# Patient Record
Sex: MALE | Race: WHITE | HISPANIC OR LATINO | Employment: FULL TIME | URBAN - METROPOLITAN AREA
[De-identification: names, ages, dates, MRNs, and addresses within clinical notes are randomized per-mention and may not be internally consistent; named-entity substitution may affect disease eponyms.]

---

## 2020-04-15 ENCOUNTER — TELEMEDICINE (OUTPATIENT)
Dept: FAMILY MEDICINE CLINIC | Facility: CLINIC | Age: 65
End: 2020-04-15
Payer: COMMERCIAL

## 2020-04-15 DIAGNOSIS — I10 ESSENTIAL HYPERTENSION: ICD-10-CM

## 2020-04-15 DIAGNOSIS — M79.605 PAIN IN POSTERIOR LEFT LOWER EXTREMITY: Primary | ICD-10-CM

## 2020-04-15 PROCEDURE — 99213 OFFICE O/P EST LOW 20 MIN: CPT | Performed by: FAMILY MEDICINE

## 2020-04-15 RX ORDER — IBUPROFEN 400 MG/1
400 TABLET ORAL EVERY 6 HOURS PRN
Qty: 120 TABLET | Refills: 0 | Status: SHIPPED | OUTPATIENT
Start: 2020-04-15 | End: 2021-06-09

## 2020-04-29 ENCOUNTER — TELEMEDICINE (OUTPATIENT)
Dept: FAMILY MEDICINE CLINIC | Facility: CLINIC | Age: 65
End: 2020-04-29
Payer: COMMERCIAL

## 2020-04-29 DIAGNOSIS — I10 ESSENTIAL HYPERTENSION: Primary | ICD-10-CM

## 2020-04-29 DIAGNOSIS — M79.605 PAIN IN POSTERIOR LEFT LOWER EXTREMITY: ICD-10-CM

## 2020-04-29 PROCEDURE — 99214 OFFICE O/P EST MOD 30 MIN: CPT | Performed by: FAMILY MEDICINE

## 2020-04-29 RX ORDER — LOSARTAN POTASSIUM 100 MG/1
100 TABLET ORAL DAILY
COMMUNITY
End: 2020-04-29 | Stop reason: SDUPTHER

## 2020-04-29 RX ORDER — CHOLECALCIFEROL TAB 125 MCG (5000 UNIT) 125 MCG (5000 UT)
2000 TAB DAILY
Qty: 36 TABLET | Refills: 0 | Status: SHIPPED | OUTPATIENT
Start: 2020-04-29 | End: 2020-05-11

## 2020-04-29 RX ORDER — CHOLECALCIFEROL TAB 125 MCG (5000 UNIT) 125 MCG (5000 UT)
1 TAB DAILY
COMMUNITY
Start: 2020-01-24 | End: 2020-04-29 | Stop reason: SDUPTHER

## 2020-04-29 RX ORDER — LOSARTAN POTASSIUM 100 MG/1
100 TABLET ORAL DAILY
Qty: 90 TABLET | Refills: 0 | Status: SHIPPED | OUTPATIENT
Start: 2020-04-29 | End: 2021-06-09 | Stop reason: SDUPTHER

## 2020-05-11 RX ORDER — CHOLECALCIFEROL (VITAMIN D3) 125 MCG
TABLET ORAL
Qty: 36 TABLET | Refills: 0 | Status: SHIPPED | OUTPATIENT
Start: 2020-05-11 | End: 2020-06-02 | Stop reason: SDUPTHER

## 2020-05-11 RX ORDER — MELATONIN
1000 2 TIMES DAILY
Qty: 180 TABLET | Refills: 0 | Status: SHIPPED | OUTPATIENT
Start: 2020-05-11 | End: 2021-06-09 | Stop reason: SDUPTHER

## 2020-05-26 ENCOUNTER — HOSPITAL ENCOUNTER (OUTPATIENT)
Dept: ULTRASOUND IMAGING | Facility: HOSPITAL | Age: 65
Discharge: HOME | End: 2020-05-26
Attending: FAMILY MEDICINE
Payer: COMMERCIAL

## 2020-05-26 DIAGNOSIS — M79.605 PAIN IN POSTERIOR LEFT LOWER EXTREMITY: ICD-10-CM

## 2020-05-26 PROCEDURE — 93970 EXTREMITY STUDY: CPT

## 2020-05-27 PROCEDURE — 93970 EXTREMITY STUDY: CPT | Performed by: SURGERY

## 2020-06-02 ENCOUNTER — OFFICE VISIT (OUTPATIENT)
Dept: FAMILY MEDICINE CLINIC | Facility: CLINIC | Age: 65
End: 2020-06-02
Payer: COMMERCIAL

## 2020-06-02 VITALS
OXYGEN SATURATION: 97 % | HEIGHT: 69 IN | WEIGHT: 199 LBS | TEMPERATURE: 98.1 F | SYSTOLIC BLOOD PRESSURE: 130 MMHG | HEART RATE: 73 BPM | DIASTOLIC BLOOD PRESSURE: 88 MMHG | RESPIRATION RATE: 16 BRPM | BODY MASS INDEX: 29.47 KG/M2

## 2020-06-02 DIAGNOSIS — J31.0 CHRONIC RHINITIS: ICD-10-CM

## 2020-06-02 DIAGNOSIS — I10 ESSENTIAL HYPERTENSION: Primary | ICD-10-CM

## 2020-06-02 DIAGNOSIS — M79.605 PAIN IN POSTERIOR LEFT LOWER EXTREMITY: ICD-10-CM

## 2020-06-02 PROCEDURE — 99214 OFFICE O/P EST MOD 30 MIN: CPT | Performed by: FAMILY MEDICINE

## 2020-06-02 PROCEDURE — 3075F SYST BP GE 130 - 139MM HG: CPT | Performed by: FAMILY MEDICINE

## 2020-06-02 PROCEDURE — 3008F BODY MASS INDEX DOCD: CPT | Performed by: FAMILY MEDICINE

## 2020-06-02 PROCEDURE — 1036F TOBACCO NON-USER: CPT | Performed by: FAMILY MEDICINE

## 2020-06-02 PROCEDURE — 3079F DIAST BP 80-89 MM HG: CPT | Performed by: FAMILY MEDICINE

## 2020-06-02 RX ORDER — MONTELUKAST SODIUM 10 MG/1
10 TABLET ORAL
Qty: 30 TABLET | Refills: 2 | Status: SHIPPED | OUTPATIENT
Start: 2020-06-02 | End: 2022-01-25 | Stop reason: SDUPTHER

## 2021-06-09 ENCOUNTER — OFFICE VISIT (OUTPATIENT)
Dept: FAMILY MEDICINE CLINIC | Facility: CLINIC | Age: 66
End: 2021-06-09
Payer: COMMERCIAL

## 2021-06-09 VITALS
SYSTOLIC BLOOD PRESSURE: 130 MMHG | DIASTOLIC BLOOD PRESSURE: 88 MMHG | BODY MASS INDEX: 29.33 KG/M2 | HEART RATE: 73 BPM | OXYGEN SATURATION: 97 % | HEIGHT: 69 IN | TEMPERATURE: 97 F | WEIGHT: 198 LBS

## 2021-06-09 DIAGNOSIS — R53.83 FATIGUE, UNSPECIFIED TYPE: ICD-10-CM

## 2021-06-09 DIAGNOSIS — Z00.00 ANNUAL PHYSICAL EXAM: ICD-10-CM

## 2021-06-09 DIAGNOSIS — I10 ESSENTIAL HYPERTENSION: ICD-10-CM

## 2021-06-09 DIAGNOSIS — R06.00 EXERTIONAL DYSPNEA: ICD-10-CM

## 2021-06-09 DIAGNOSIS — M79.605 PAIN IN POSTERIOR LEFT LOWER EXTREMITY: ICD-10-CM

## 2021-06-09 DIAGNOSIS — G89.29 CHRONIC PAIN OF RIGHT KNEE: ICD-10-CM

## 2021-06-09 DIAGNOSIS — R10.33 PERIUMBILICAL ABDOMINAL PAIN: ICD-10-CM

## 2021-06-09 DIAGNOSIS — M25.561 CHRONIC PAIN OF RIGHT KNEE: ICD-10-CM

## 2021-06-09 DIAGNOSIS — Z00.00 ROUTINE MEDICAL EXAM: Primary | ICD-10-CM

## 2021-06-09 PROCEDURE — 1036F TOBACCO NON-USER: CPT | Performed by: FAMILY MEDICINE

## 2021-06-09 PROCEDURE — 3008F BODY MASS INDEX DOCD: CPT | Performed by: FAMILY MEDICINE

## 2021-06-09 PROCEDURE — 3079F DIAST BP 80-89 MM HG: CPT | Performed by: FAMILY MEDICINE

## 2021-06-09 PROCEDURE — 3075F SYST BP GE 130 - 139MM HG: CPT | Performed by: FAMILY MEDICINE

## 2021-06-09 PROCEDURE — 3288F FALL RISK ASSESSMENT DOCD: CPT | Performed by: FAMILY MEDICINE

## 2021-06-09 PROCEDURE — 99397 PER PM REEVAL EST PAT 65+ YR: CPT | Performed by: FAMILY MEDICINE

## 2021-06-09 PROCEDURE — 3725F SCREEN DEPRESSION PERFORMED: CPT | Performed by: FAMILY MEDICINE

## 2021-06-09 PROCEDURE — 1101F PT FALLS ASSESS-DOCD LE1/YR: CPT | Performed by: FAMILY MEDICINE

## 2021-06-09 RX ORDER — LOSARTAN POTASSIUM 100 MG/1
100 TABLET ORAL DAILY
Qty: 90 TABLET | Refills: 0 | Status: SHIPPED | OUTPATIENT
Start: 2021-06-09 | End: 2021-09-07

## 2021-06-09 RX ORDER — MELATONIN
1000 DAILY
Qty: 90 TABLET | Refills: 0 | Status: SHIPPED | OUTPATIENT
Start: 2021-06-09 | End: 2021-09-07

## 2021-06-10 NOTE — PROGRESS NOTES
ADULT ANNUAL PHYSICAL  [address] [facility] CARE [place]    NAME: Dawit Segura  AGE: 73 y o  SEX: male  : 1955     DATE: 2021     Assessment and Plan:bertram  Medical  Exam -  Discussed  Diet  Exercise   Life  Style  Modifications  His  bp  Is  Stable     htn -  Keep  The  bp  Log   Will  F/u  With  Labs     Rt knee pain  -  Discussed  Precautions  Normal  Exam  Will  F/u  With  X ray  Tylenol prn     Exertional  Dyspnea  At  Times  -  No  Cp  Or  Sob   Will  Get  A  Cardiac  Evaluation     Abdominal pain  Paraumbilical  At  Times  No pain  Currently  Normal  Exam  Today   No  Other  Gi  Symptoms  Will   F/u  With  US      Problem List Items Addressed This Visit        Cardiovascular and Mediastinum    Essential hypertension    Relevant Medications    losartan (COZAAR) 100 MG tablet    Other Relevant Orders    CBC and differential    Comprehensive metabolic panel    Lipid panel       Other    Pain in posterior left lower extremity    Relevant Medications    cholecalciferol (VITAMIN D3) 1,000 units tablet    Routine medical exam - Primary    Chronic pain of right knee    Relevant Orders    XR knee 3 vw right non injury      Other Visit Diagnoses     Fatigue, unspecified type        Relevant Orders    TSH, 3rd generation    Periumbilical abdominal pain        Relevant Orders    US abdomen complete    Lipase    Amylase    Exertional dyspnea        Relevant Orders    Ambulatory referral to Cardiology          Immunizations and preventive care screenings were discussed with patient today  Appropriate education was printed on patient's after visit summary  Counseling:  · Dental Health: discussed importance of regular tooth brushing, flossing, and dental visits  No follow-ups on file  Chief Complaint:     Chief Complaint   Patient presents with    Follow-up     Stomach bump when he tenses       Knee Pain     5/10      History of Present Illness:     Adult Annual Physical   Patient here for a comprehensive physical exam  The patient reports problems - as  listed  Diet and Physical Activity  · Diet/Nutrition: well balanced diet  · Exercise: walking  Depression Screening  PHQ-9 Depression Screening    PHQ-9:   Frequency of the following problems over the past two weeks:      Little interest or pleasure in doing things: 2 - more than half the days  Feeling down, depressed, or hopeless: 0 - not at all  PHQ-2 Score: 2       General Health  · Sleep: sleeps well  · Hearing: normal - bilateral   · Vision: no vision problems  · Dental: regular dental visits   Health  · Symptoms include: none     Review of Systems:     Review of Systems   Constitutional: Negative for fatigue and fever  HENT: Negative for congestion, sinus pressure and sore throat  Eyes: Negative for photophobia, pain, discharge, redness and itching  Respiratory: Negative for shortness of breath and wheezing  Cardiovascular: Negative for chest pain, palpitations and leg swelling  H/o  Exertional  Dyspnea   , htn   Gastrointestinal: Negative for abdominal pain, constipation, diarrhea, nausea and vomiting  H/o  Abdominal pain   Endocrine: Negative for cold intolerance, heat intolerance, polydipsia and polyphagia  Genitourinary: Negative for dysuria, hematuria and urgency  Musculoskeletal: Positive for arthralgias  Negative for back pain, gait problem and joint swelling  Neurological: Negative for dizziness and headaches  Psychiatric/Behavioral: Negative for self-injury and sleep disturbance  The patient is not nervous/anxious         Past Medical History:     Past Medical History:   Diagnosis Date    Hyperlipidemia     Hypertension     Seizure disorder ([address] )     Sleep apnea     Tremors of nervous system     per Brittany Rivera Vitamin D deficiency disease       Past Surgical History:     Past Surgical History:   Procedure Laterality Date    NO PAST SURGERIES Family History:     Family History   Problem Relation Age of Onset    Colon cancer Brother     Stroke Brother     Heart disease Brother         pacemaker in situ      Social History:     E-Cigarette/Vaping    E-Cigarette Use Never User      E-Cigarette/Vaping Substances    Nicotine No     THC No     CBD No     Flavoring No     Other No     Unknown No      Social History     Socioeconomic History    Marital status: Single     Spouse name: None    Number of children: None    Years of education: 15    Highest education level: High school graduate   Occupational History    Occupation:    Social Needs    Financial resource strain: Not hard at all   Miguel-Aurelia insecurity     Worry: Never true     Inability: Never true    Transportation needs     Medical: No     Non-medical: No   Tobacco Use    Smoking status: Never Smoker    Smokeless tobacco: Never Used   Substance and Sexual Activity    Alcohol use: Not Currently     Comment: No use     Drug use: Never     Comment: No use    Sexual activity: None   Lifestyle    Physical activity     Days per week: 5 days     Minutes per session: 60 min    Stress: Not at all   Relationships    Social connections     Talks on phone: Once a week     Gets together: Once a week     Attends Orthodoxy service: Never     Active member of club or organization: No     Attends meetings of clubs or organizations: Never     Relationship status: Living with partner    Intimate partner violence     Fear of current or ex partner: No     Emotionally abused: No     Physically abused: No     Forced sexual activity: No   Other Topics Concern    None   Social History Narrative    · Most recent tobacco use screenin2020      · Were you activated, into active duty, as a member of the [employer] or as a Reservist:   No       · Sexual orientation:   Heterosexual      · Exercise level:   None      · Diet:   Regular      · General stress level:   Low · Caffeine intake:   Heavy    · Guns present in home:   No      · Seat belts used routinely:   Yes      · Smoke alarm in home: Yes      · Advance directive:   No       Current Medications:     Current Outpatient Medications   Medication Sig Dispense Refill    cholecalciferol (VITAMIN D3) 1,000 units tablet Take 1 tablet (1,000 Units total) by mouth daily 90 tablet 0    ibuprofen (MOTRIN) 400 mg tablet Take 1 tablet (400 mg total) by mouth every 6 (six) hours as needed for mild pain 120 tablet 0    losartan (COZAAR) 100 MG tablet Take 1 tablet (100 mg total) by mouth daily 90 tablet 0    montelukast (SINGULAIR) 10 mg tablet Take 1 tablet (10 mg total) by mouth daily at bedtime 30 tablet 2     No current facility-administered medications for this visit  Allergies:     No Known Allergies   Physical Exam:     /88 (BP Location: Left arm, Patient Position: Sitting, Cuff Size: Standard)   Pulse 73   Temp (!) 97 °F (36 1 °C) (Tympanic)   Ht 5' 9" (1 753 m)   Wt 89 8 kg (198 lb)   SpO2 97%   BMI 29 24 kg/m²     Physical Exam  Vitals signs and nursing note reviewed  Constitutional:       General: He is not in acute distress  Appearance: Normal appearance  He is not ill-appearing, toxic-appearing or diaphoretic  HENT:      Head: Normocephalic  Nose: Nose normal    Eyes:      Extraocular Movements: Extraocular movements intact  Conjunctiva/sclera: Conjunctivae normal       Pupils: Pupils are equal, round, and reactive to light  Neck:      Musculoskeletal: Normal range of motion and neck supple  No neck rigidity or muscular tenderness  Vascular: No carotid bruit  Cardiovascular:      Rate and Rhythm: Normal rate and regular rhythm  Pulses: Normal pulses  Heart sounds: Normal heart sounds  No murmur  No gallop  Pulmonary:      Effort: Pulmonary effort is normal       Breath sounds: Normal breath sounds     Abdominal:      General: Bowel sounds are normal  There is no distension  Palpations: There is no mass  Tenderness: There is no abdominal tenderness  There is no guarding or rebound  Musculoskeletal: Normal range of motion  General: No swelling  Right lower leg: No edema  Left lower leg: No edema  Lymphadenopathy:      Cervical: No cervical adenopathy  Neurological:      General: No focal deficit present  Mental Status: He is alert and oriented to person, place, and time  Cranial Nerves: No cranial nerve deficit  Psychiatric:         Mood and Affect: Mood normal          Behavior: Behavior normal          Thought Content:  Thought content normal           Catalina Key MD  [facility]

## 2021-06-10 NOTE — PROGRESS NOTES
BMI Counseling: Body mass index is 29 24 kg/m²  The BMI is above normal  Nutrition recommendations include decreasing portion sizes, encouraging healthy choices of fruits and vegetables, decreasing fast food intake, consuming healthier snacks, limiting drinks that contain sugar, moderation in carbohydrate intake, increasing intake of lean protein, reducing intake of saturated and trans fat and reducing intake of cholesterol  Exercise recommendations include moderate physical activity 150 minutes/week

## 2021-06-10 NOTE — PATIENT INSTRUCTIONS
Wellness Visit for Adults   AMBULATORY CARE:   A wellness visit  is when you see your healthcare provider to get screened for health problems  Your healthcare provider will also give you advice on how to stay healthy  Write down your questions so you remember to ask them  Ask your healthcare provider how often you should have a wellness visit  What happens at a wellness visit:  Your healthcare provider will ask about your health, and your family history of health problems  This includes high blood pressure, heart disease, and cancer  He or she will ask if you have symptoms that concern you, if you smoke, and about your mood  You may also be asked about your intake of medicines, supplements, food, and alcohol  Any of the following may be done:  · Your weight  will be checked  Your height may also be checked so your body mass index (BMI) can be calculated  Your BMI shows if you are at a healthy weight  · Your blood pressure  and heart rate will be checked  Your temperature may also be checked  · Blood and urine tests  may be done  Blood tests may be done to check your cholesterol levels  Abnormal cholesterol levels increase your risk for heart disease and stroke  You may also need a blood or urine test to check for diabetes if you are at increased risk  Urine tests may be done to look for signs of an infection or kidney disease  · A physical exam  includes checking your heartbeat and lungs with a stethoscope  Your healthcare provider may also check your skin to look for sun damage  · Screening tests  may be recommended  A screening test is done to check for diseases that may not cause symptoms  The screening tests you may need depend on your age, gender, family history, and lifestyle habits  For example, colorectal screening may be recommended if you are 48years old or older  Screening tests you need if you are a woman:   · A Pap smear  is used to screen for cervical cancer   Pap smears are usually done every 3 to 5 years depending on your age  You may need them more often if you have had abnormal Pap smear test results in the past  Ask your healthcare provider how often you should have a Pap smear  · A mammogram  is an x-ray of your breasts to screen for breast cancer  Experts recommend mammograms every 2 years starting at age 48 years  You may need a mammogram at age 52 years or younger if you have an increased risk for breast cancer  Talk to your healthcare provider about when you should start having mammograms and how often you need them  Vaccines you may need:   · Get an influenza vaccine  every year  The influenza vaccine protects you from the flu  Several types of viruses cause the flu  The viruses change over time, so new vaccines are made each year  · Get a tetanus-diphtheria (Td) booster vaccine  every 10 years  This vaccine protects you against tetanus and diphtheria  Tetanus is a severe infection that may cause painful muscle spasms and lockjaw  Diphtheria is a severe bacterial infection that causes a thick covering in the back of your mouth and throat  · Get a human papillomavirus (HPV) vaccine  if you are female and aged 23 to 32 or male 23 to 24 and never received it  This vaccine protects you from HPV infection  HPV is the most common infection spread by sexual contact  HPV may also cause vaginal, penile, and anal cancers  · Get a pneumococcal vaccine  if you are aged 72 years or older  The pneumococcal vaccine is an injection given to protect you from pneumococcal disease  Pneumococcal disease is an infection caused by pneumococcal bacteria  The infection may cause pneumonia, meningitis, or an ear infection  · Get a shingles vaccine  if you are 60 or older, even if you have had shingles before  The shingles vaccine is an injection to protect you from the varicella-zoster virus  This is the same virus that causes chickenpox   Shingles is a painful rash that develops in people who had chickenpox or have been exposed to the virus  How to eat healthy:  My Plate is a model for planning healthy meals  It shows the types and amounts of foods that should go on your plate  Fruits and vegetables make up about half of your plate, and grains and protein make up the other half  A serving of dairy is included on the side of your plate  The amount of calories and serving sizes you need depends on your age, gender, weight, and height  Examples of healthy foods are listed below:  · Eat a variety of vegetables  such as dark green, red, and orange vegetables  You can also include canned vegetables low in sodium (salt) and frozen vegetables without added butter or sauces  · Eat a variety of fresh fruits , canned fruit in 100% juice, frozen fruit, and dried fruit  · Include whole grains  At least half of the grains you eat should be whole grains  Examples include whole-wheat bread, wheat pasta, brown rice, and whole-grain cereals such as oatmeal     · Eat a variety of protein foods such as seafood (fish and shellfish), lean meat, and poultry without skin (turkey and chicken)  Examples of lean meats include pork leg, shoulder, or tenderloin, and beef round, sirloin, tenderloin, and extra lean ground beef  Other protein foods include eggs and egg substitutes, beans, peas, soy products, nuts, and seeds  · Choose low-fat dairy products such as skim or 1% milk or low-fat yogurt, cheese, and cottage cheese  · Limit unhealthy fats  such as butter, hard margarine, and shortening  Exercise:  Exercise at least 30 minutes per day on most days of the week  Some examples of exercise include walking, biking, dancing, and swimming  You can also fit in more physical activity by taking the stairs instead of the elevator or parking farther away from stores  Include muscle strengthening activities 2 days each week  Regular exercise provides many health benefits   It helps you manage your weight, and decreases your risk for type 2 diabetes, heart disease, stroke, and high blood pressure  Exercise can also help improve your mood  Ask your healthcare provider about the best exercise plan for you  General health and safety guidelines:   · Do not smoke  Nicotine and other chemicals in cigarettes and cigars can cause lung damage  Ask your healthcare provider for information if you currently smoke and need help to quit  E-cigarettes or smokeless tobacco still contain nicotine  Talk to your healthcare provider before you use these products  · Limit alcohol  A drink of alcohol is 12 ounces of beer, 5 ounces of wine, or 1½ ounces of liquor  · Lose weight, if needed  Being overweight increases your risk of certain health conditions  These include heart disease, high blood pressure, type 2 diabetes, and certain types of cancer  · Protect your skin  Do not sunbathe or use tanning beds  Use sunscreen with a SPF 15 or higher  Apply sunscreen at least 15 minutes before you go outside  Reapply sunscreen every 2 hours  Wear protective clothing, hats, and sunglasses when you are outside  · Drive safely  Always wear your seatbelt  Make sure everyone in your car wears a seatbelt  A seatbelt can save your life if you are in an accident  Do not use your cell phone when you are driving  This could distract you and cause an accident  Pull over if you need to make a call or send a text message  · Practice safe sex  Use latex condoms if are sexually active and have more than one partner  Your healthcare provider may recommend screening tests for sexually transmitted infections (STIs)  · Wear helmets, lifejackets, and protective gear  Always wear a helmet when you ride a bike or motorcycle, go skiing, or play sports that could cause a head injury  Wear protective equipment when you play sports  Wear a lifejacket when you are on a boat or doing water sports      © Copyright [employer] 2020 Information is for End User's use only and may not be sold, redistributed or otherwise used for commercial purposes  All illustrations and images included in CareNotes® are the copyrighted property of A D A M , Inc  or Monico Patel  The above information is an  only  It is not intended as medical advice for individual conditions or treatments  Talk to your doctor, nurse or pharmacist before following any medical regimen to see if it is safe and effective for you  Obesity   AMBULATORY CARE:   Obesity  is when your body mass index (BMI) is greater than 30  Your healthcare provider will use your height and weight to measure your BMI  The risks of obesity include  many health problems, such as injuries or physical disability  You may need tests to check for the following:  · Diabetes    · High blood pressure or high cholesterol    · Heart disease    · Gallbladder or liver disease    · Cancer of the colon, breast, prostate, liver, or kidney    · Sleep apnea    · Arthritis or gout    Seek care immediately if:   · You have a severe headache, confusion, or difficulty speaking  · You have weakness on one side of your body  · You have chest pain, sweating, or shortness of breath  Contact your healthcare provider if:   · You have symptoms of gallbladder or liver disease, such as pain in your upper abdomen  · You have knee or hip pain and discomfort while walking  · You have symptoms of diabetes, such as intense hunger and thirst, and frequent urination  · You have symptoms of sleep apnea, such as snoring or daytime sleepiness  · You have questions or concerns about your condition or care  Treatment for obesity  focuses on helping you lose weight to improve your health  Even a small decrease in BMI can reduce the risk for many health problems  Your healthcare provider will help you set a weight-loss goal   · Lifestyle changes  are the first step in treating obesity   These include making healthy food choices and getting regular physical activity  Your healthcare provider may suggest a weight-loss program that involves coaching, education, and therapy  · Medicine  may help you lose weight when it is used with a healthy diet and physical activity  · Surgery  can help you lose weight if you are very obese and have other health problems  There are several types of weight-loss surgery  Ask your healthcare provider for more information  Be successful losing weight:   · Set small, realistic goals  An example of a small goal is to walk for 20 minutes 5 days a week  Anther goal is to lose 5% of your body weight  · Tell friends, family members, and coworkers about your goals  and ask for their support  Ask a friend to lose weight with you, or join a weight-loss support group  · Identify foods or triggers that may cause you to overeat , and find ways to avoid them  Remove tempting high-calorie foods from your home and workplace  Place a bowl of fresh fruit on your kitchen counter  If stress causes you to eat, then find other ways to cope with stress  · Keep a diary to track what you eat and drink  Also write down how many minutes of physical activity you do each day  Weigh yourself once a week and record it in your diary  Eating changes: You will need to eat 500 to 1,000 fewer calories each day than you currently eat to lose 1 to 2 pounds a week  The following changes will help you cut calories:  · Eat smaller portions  Use small plates, no larger than 9 inches in diameter  Fill your plate half full of fruits and vegetables  Measure your food using measuring cups until you know what a serving size looks like  · Eat 3 meals and 1 or 2 snacks each day  Plan your meals in advance  [place] and eat at home most of the time  Eat slowly  Do not skip meals  Skipping meals can lead to overeating later in the day  This can make it harder for you to lose weight   Talk with a dietitian to help you make a meal plan and schedule that is right for you  · Eat fruits and vegetables at every meal   They are low in calories and high in fiber, which makes you feel full  Do not add butter, margarine, or cream sauce to vegetables  Use herbs to season steamed vegetables  · Eat less fat and fewer fried foods  Eat more baked or grilled chicken and fish  These protein sources are lower in calories and fat than red meat  Limit fast food  Dress your salads with olive oil and vinegar instead of bottled dressing  · Limit the amount of sugar you eat  Do not drink sugary beverages  Limit alcohol  Activity changes:  Physical activity is good for your body in many ways  It helps you burn calories and build strong muscles  It decreases stress and depression, and improves your mood  It can also help you sleep better  Talk to your healthcare provider before you begin an exercise program   · Exercise for at least 30 minutes 5 days a week  Start slowly  Set aside time each day for physical activity that you enjoy and that is convenient for you  It is best to do both weight training and an activity that increases your heart rate, such as walking, bicycling, or swimming  · Find ways to be more active  Do yard work and housecleaning  Walk up the stairs instead of using elevators  Spend your leisure time going to events that require walking, such as outdoor festivals or fairs  This extra physical activity can help you lose weight and keep it off  Follow up with your healthcare provider as directed: You may need to meet with a dietitian  Write down your questions so you remember to ask them during your visits  © Copyright [address] Information is for End User's use only and may not be sold, redistributed or otherwise used for commercial purposes   All illustrations and images included in CareNotes® are the copyrighted property of A D A M , Inc  or [facility] Dilshad Fortune   The above information is an  only  It is not intended as medical advice for individual conditions or treatments  Talk to your doctor, nurse or pharmacist before following any medical regimen to see if it is safe and effective for you  Cholesterol and Your Health   AMBULATORY CARE:   Cholesterol  is a waxy, fat-like substance  Your body uses cholesterol to make hormones and new cells, and to protect nerves  Cholesterol is made by your body  It also comes from certain foods you eat, such as meat and dairy products  Your healthcare provider can help you set goals for your cholesterol levels  He or she can help you create a plan to meet your goals  Cholesterol level goals: Your cholesterol level goals depend on your risk for heart disease, your age, and your other health conditions  The following are general guidelines:  · Total cholesterol  includes low-density lipoprotein (LDL), high-density lipoprotein (HDL), and triglyceride levels  The total cholesterol level should be lower than 200 mg/dL and is best at about 150 mg/dL  · LDL cholesterol  is called bad cholesterol  because it forms plaque in your arteries  As plaque builds up, your arteries become narrow, and less blood flows through  When plaque decreases blood flow to your heart, you may have chest pain  If plaque completely blocks an artery that brings blood to your heart, you may have a heart attack  Plaque can break off and form blood clots  Blood clots may block arteries in your brain and cause a stroke  The level should be less than 130 mg/dL and is best at about 100 mg/dL  · HDL cholesterol  is called good cholesterol  because it helps remove LDL cholesterol from your arteries  It does this by attaching to LDL cholesterol and carrying it to your liver  Your liver breaks down LDL cholesterol so your body can get rid of it  High levels of HDL cholesterol can help prevent a heart attack and stroke   Low levels of HDL cholesterol can increase your risk for heart disease, heart attack, and stroke  The level should be 60 mg/dL or higher  · Triglycerides  are a type of fat that store energy from foods you eat  High levels of triglycerides also cause plaque buildup  This can increase your risk for a heart attack or stroke  If your triglyceride level is high, your LDL cholesterol level may also be high  The level should be less than 150 mg/dL  What increases your risk for high cholesterol:   · Smoking cigarettes    · Being overweight or obese, or not getting enough exercise    · Drinking large amounts of alcohol    · A medical condition such as hypertension (high blood pressure) or diabetes    · Certain genes passed from your parents to you    · Age older than 65 years    What you need to know about having your cholesterol levels checked: Adults 21to 39years of age should have their cholesterol levels checked every 4 to 6 years  Adults 45 years or older should have their cholesterol checked every 1 to 2 years  You may need your cholesterol checked more often, or at a younger age, if you have risk factors for heart disease  You may also need to have your cholesterol checked more often if you have other health conditions, such as diabetes  Blood tests are used to check cholesterol levels  Blood tests measure your levels of triglycerides, LDL cholesterol, and HDL cholesterol  How healthy fats affect your cholesterol levels:  Healthy fats, also called unsaturated fats, help lower LDL cholesterol and triglyceride levels  Healthy fats include the following:  · Monounsaturated fats  are found in foods such as olive oil, canola oil, avocado, nuts, and olives  · Polyunsaturated fats,  such as omega 3 fats, are found in fish, such as salmon, trout, and tuna  They can also be found in plant foods such as flaxseed, walnuts, and soybeans  How unhealthy fats affect your cholesterol levels:  Unhealthy fats increase LDL cholesterol and triglyceride levels   They are found in foods high in cholesterol, saturated fat, and trans fat:  · Cholesterol  is found in eggs, dairy, and meat  · Saturated fat  is found in butter, cheese, ice cream, whole milk, and coconut oil  Saturated fat is also found in meat, such as sausage, hot dogs, and bologna  · Trans fat  is found in liquid oils and is used in fried and baked foods  Foods that contain trans fats include chips, crackers, muffins, sweet rolls, microwave popcorn, and cookies  Treatment  for high cholesterol will also decrease your risk of heart disease, heart attack, and stroke  Treatment may include any of the following:  · Lifestyle changes  may include food, exercise, weight loss, and quitting smoking  You may also need to decrease the amount of alcohol you drink  Your healthcare provider will want you to start with lifestyle changes  Other treatment may be added if lifestyle changes are not enough  · Medicines  may be given to lower your LDL cholesterol, triglyceride levels, or total cholesterol level  You may need medicines to lower your cholesterol if any of the following is true:     ? You have a history of stroke, TIA, unstable angina, or a heart attack  ? Your LDL cholesterol level is 190 mg/dL or higher  ? You are age 36 to 76 years, have diabetes or heart disease risk factors, and your LDL cholesterol is 70 mg/dL or higher  · Supplements  include fish oil, red yeast rice, and garlic  Fish oil may help lower your triglyceride and LDL cholesterol levels  It may also increase your HDL cholesterol level  Red yeast rice may help decrease your total cholesterol level and LDL cholesterol level  Garlic may help lower your total cholesterol level  Do not take these supplements without talking to your healthcare provider  Food changes you can make to lower your cholesterol levels:  A dietitian can help you create a healthy eating plan   He or she can show you how to read food labels and choose foods low in saturated fat, trans fats, and cholesterol  · Decrease the total amount of fat you eat  Choose lean meats, fat-free or 1% fat milk, and low-fat dairy products, such as yogurt and cheese  Try to limit or avoid red meats  Limit or do not eat fried foods or baked goods, such as cookies  · Replace unhealthy fats with healthy fats  Cook foods in olive oil or canola oil  Choose soft margarines that are low in saturated fat and trans fat  Seeds, nuts, and avocados are other examples of healthy fats  · Eat foods with omega-3 fats  Examples include salmon, tuna, mackerel, walnuts, and flaxseed  Eat fish 2 times per week  Pregnant women should not eat fish that have high levels of mercury, such as shark, swordfish, and maryse mackerel  · Increase the amount of high-fiber foods you eat  High-fiber foods can help lower your LDL cholesterol  Aim to get between 20 and 30 grams of fiber each day  Fruits and vegetables are high in fiber  Eat at least 5 servings each day  Other high-fiber foods are whole-grain or whole-wheat breads, pastas, or cereals, and brown rice  Eat 3 ounces of whole-grain foods each day  Increase fiber slowly  You may have abdominal discomfort, bloating, and gas if you add fiber to your diet too quickly  · Eat healthy protein foods  Examples include low-fat dairy products, skinless chicken and turkey, fish, and nuts  · Limit foods and drinks that are high in sugar  Your dietitian or healthcare provider can help you create daily limits for high-sugar foods and drinks  The limit may be lower if you have diabetes or another health condition  Limits can also help you lose weight if needed  Lifestyle changes you can make to lower your cholesterol levels:   · Maintain a healthy weight  Ask your healthcare provider what a healthy weight is for you  Ask him or her to help you create a weight loss plan if needed  Weight loss can decrease your total cholesterol and triglyceride levels   Weight loss may also help keep your blood pressure at a healthy level  · Exercise regularly  Exercise can help lower your total cholesterol level and maintain a healthy weight  Exercise can also help increase your HDL cholesterol level  Work with your healthcare provider to create an exercise program that is right for you  Get at least 30 to 40 minutes of moderate exercise most days of the week  Examples of exercise include brisk walking, swimming, or biking  · Do not smoke  Nicotine and other chemicals in cigarettes and cigars can raise your cholesterol levels  Ask your healthcare provider for information if you currently smoke and need help to quit  E-cigarettes or smokeless tobacco still contain nicotine  Talk to your healthcare provider before you use these products  · Limit or do not drink alcohol  Alcohol can increase your triglyceride levels  Ask your healthcare provider before you drink alcohol  Ask how much is okay for you to drink in 1 day or 1 week  © Copyright [facility] [address] Information is for End User's use only and may not be sold, redistributed or otherwise used for commercial purposes  All illustrations and images included in CareNotes® are the copyrighted property of [employer]  or [address] [place], [place] [address]tobin Fortune   The above information is an  only  It is not intended as medical advice for individual conditions or treatments  Talk to your doctor, nurse or pharmacist before following any medical regimen to see if it is safe and effective for you

## 2021-06-22 ENCOUNTER — HOSPITAL ENCOUNTER (OUTPATIENT)
Dept: ULTRASOUND IMAGING | Facility: HOSPITAL | Age: 66
Discharge: HOME | End: 2021-06-22
Payer: COMMERCIAL

## 2021-06-22 ENCOUNTER — HOSPITAL ENCOUNTER (OUTPATIENT)
Dept: RADIOLOGY | Facility: HOSPITAL | Age: 66
Discharge: HOME | End: 2021-06-22
Payer: COMMERCIAL

## 2021-06-22 DIAGNOSIS — G89.29 CHRONIC PAIN OF RIGHT KNEE: ICD-10-CM

## 2021-06-22 DIAGNOSIS — M25.561 CHRONIC PAIN OF RIGHT KNEE: ICD-10-CM

## 2021-06-22 DIAGNOSIS — R10.33 PERIUMBILICAL ABDOMINAL PAIN: ICD-10-CM

## 2021-06-22 PROCEDURE — 73562 X-RAY EXAM OF KNEE 3: CPT

## 2021-06-24 ENCOUNTER — HOSPITAL ENCOUNTER (OUTPATIENT)
Dept: ULTRASOUND IMAGING | Facility: HOSPITAL | Age: 66
Discharge: HOME | End: 2021-06-24
Payer: COMMERCIAL

## 2021-06-24 PROCEDURE — 76700 US EXAM ABDOM COMPLETE: CPT

## 2021-06-28 ENCOUNTER — TELEPHONE (OUTPATIENT)
Dept: FAMILY MEDICINE CLINIC | Facility: CLINIC | Age: 66
End: 2021-06-28

## 2021-06-28 NOTE — TELEPHONE ENCOUNTER
Left message on voicemail for patient to keep [phone] appointment to get results     Jaylan Shane

## 2021-06-28 NOTE — TELEPHONE ENCOUNTER
----- Message from Wojciech Greenfield, [address] sent at 6/25/2021  5:56 PM EDT -----  Maria Ines Dunlap discuss in greater detail with Dr WEATHERS in [place] make sure she keeps appt      IMPRESSION:     Borderline hepatomegaly     Hepatic mild steatosis with focal fatty sparing                 Workstation performed: [id number]

## 2021-06-30 ENCOUNTER — TELEPHONE (OUTPATIENT)
Dept: FAMILY MEDICINE CLINIC | Facility: CLINIC | Age: 66
End: 2021-06-30

## 2021-06-30 NOTE — TELEPHONE ENCOUNTER
Left a message for the patient to call the office back in regards to his recent imaging results  Knee XR showed arthritis  Dr Oma Tanner will discuss the report in detail at his visit on 7/7

## 2021-07-04 LAB
ALBUMIN SERPL-MCNC: 4.2 G/DL (ref 3.6–5.1)
ALBUMIN/GLOB SERPL: 1.7 (CALC) (ref 1–2.5)
ALP SERPL-CCNC: 93 U/L (ref 35–144)
ALT SERPL-CCNC: 18 U/L (ref 9–46)
AMYLASE SERPL-CCNC: 37 U/L (ref 21–101)
AST SERPL-CCNC: 18 U/L (ref 10–35)
BASOPHILS # BLD AUTO: 28 CELLS/UL (ref 0–200)
BASOPHILS NFR BLD AUTO: 0.4 %
BILIRUB SERPL-MCNC: 0.9 MG/DL (ref 0.2–1.2)
BUN SERPL-MCNC: 15 MG/DL (ref 7–25)
BUN/CREAT SERPL: NORMAL (CALC) (ref 6–22)
CALCIUM SERPL-MCNC: 8.9 MG/DL (ref 8.6–10.3)
CHLORIDE SERPL-SCNC: 103 MMOL/L (ref 98–110)
CHOLEST SERPL-MCNC: 147 MG/DL
CHOLEST/HDLC SERPL: 4.1 (CALC)
CO2 SERPL-SCNC: 29 MMOL/L (ref 20–32)
CREAT SERPL-MCNC: 0.76 MG/DL (ref 0.7–1.25)
EOSINOPHIL # BLD AUTO: 270 CELLS/UL (ref 15–500)
EOSINOPHIL NFR BLD AUTO: 3.8 %
ERYTHROCYTE [DISTWIDTH] IN BLOOD BY AUTOMATED COUNT: 12.4 % (ref 11–15)
GLOBULIN SER CALC-MCNC: 2.5 G/DL (CALC) (ref 1.9–3.7)
GLUCOSE SERPL-MCNC: 97 MG/DL (ref 65–99)
HCT VFR BLD AUTO: 47.3 % (ref 38.5–50)
HDLC SERPL-MCNC: 36 MG/DL
HGB BLD-MCNC: 15.6 G/DL (ref 13.2–17.1)
LDLC SERPL CALC-MCNC: 90 MG/DL (CALC)
LIPASE SERPL-CCNC: 24 U/L (ref 7–60)
LYMPHOCYTES # BLD AUTO: 2109 CELLS/UL (ref 850–3900)
LYMPHOCYTES NFR BLD AUTO: 29.7 %
MCH RBC QN AUTO: 30.3 PG (ref 27–33)
MCHC RBC AUTO-ENTMCNC: 33 G/DL (ref 32–36)
MCV RBC AUTO: 91.8 FL (ref 80–100)
MONOCYTES # BLD AUTO: 589 CELLS/UL (ref 200–950)
MONOCYTES NFR BLD AUTO: 8.3 %
NEUTROPHILS # BLD AUTO: 4104 CELLS/UL (ref 1500–7800)
NEUTROPHILS NFR BLD AUTO: 57.8 %
NONHDLC SERPL-MCNC: 111 MG/DL (CALC)
PLATELET # BLD AUTO: 231 THOUSAND/UL (ref 140–400)
PMV BLD REES-ECKER: 11.2 FL (ref 7.5–12.5)
POTASSIUM SERPL-SCNC: 4.3 MMOL/L (ref 3.5–5.3)
PROT SERPL-MCNC: 6.7 G/DL (ref 6.1–8.1)
RBC # BLD AUTO: 5.15 MILLION/UL (ref 4.2–5.8)
SL AMB EGFR AFRICAN AMERICAN: 111 ML/MIN/1.73M2
SL AMB EGFR NON AFRICAN AMERICAN: 96 ML/MIN/1.73M2
SODIUM SERPL-SCNC: 139 MMOL/L (ref 135–146)
TRIGL SERPL-MCNC: 110 MG/DL
TSH SERPL-ACNC: 0.5 MIU/L (ref 0.4–4.5)
WBC # BLD AUTO: 7.1 THOUSAND/UL (ref 3.8–10.8)

## 2021-07-07 ENCOUNTER — OFFICE VISIT (OUTPATIENT)
Dept: FAMILY MEDICINE CLINIC | Facility: CLINIC | Age: 66
End: 2021-07-07
Payer: COMMERCIAL

## 2021-07-07 VITALS
HEART RATE: 62 BPM | SYSTOLIC BLOOD PRESSURE: 112 MMHG | HEIGHT: 69 IN | OXYGEN SATURATION: 98 % | DIASTOLIC BLOOD PRESSURE: 64 MMHG | TEMPERATURE: 98.1 F | WEIGHT: 203.6 LBS | BODY MASS INDEX: 30.16 KG/M2

## 2021-07-07 DIAGNOSIS — I10 ESSENTIAL HYPERTENSION: Primary | ICD-10-CM

## 2021-07-07 DIAGNOSIS — J31.0 CHRONIC RHINITIS: ICD-10-CM

## 2021-07-07 DIAGNOSIS — Z12.11 SCREENING FOR COLON CANCER: ICD-10-CM

## 2021-07-07 PROBLEM — E55.9 VITAMIN D DEFICIENCY: Status: ACTIVE | Noted: 2021-07-07

## 2021-07-07 PROCEDURE — 3074F SYST BP LT 130 MM HG: CPT | Performed by: FAMILY MEDICINE

## 2021-07-07 PROCEDURE — 1036F TOBACCO NON-USER: CPT | Performed by: FAMILY MEDICINE

## 2021-07-07 PROCEDURE — 99214 OFFICE O/P EST MOD 30 MIN: CPT | Performed by: FAMILY MEDICINE

## 2021-07-07 PROCEDURE — 3008F BODY MASS INDEX DOCD: CPT | Performed by: FAMILY MEDICINE

## 2021-07-07 PROCEDURE — 3078F DIAST BP <80 MM HG: CPT | Performed by: FAMILY MEDICINE

## 2021-07-07 NOTE — PROGRESS NOTES
Assessment/Plan:    HTN-  Stable  Now   Keep  Logging    Fatty liver / mildly  Enlarged     Liver  Discussed  Diet  Weight  Management  He  Declined  To  See  A  GI  When  I  Offered  A referral  For  Evaluation     Rt knee pain  And  Arthritis   Mild  X ray  Reviewed     He  Ha s no pain or  Swelling    So    Suggested  Supportive  Treatment  Return  Parameters  Discussed  He  Does  Not  Want to  See  The  Orthopedic     Vit  D  defeciency   He  Has  Labs  Orders  In  Place      Problem List Items Addressed This Visit        Respiratory    Chronic rhinitis       Cardiovascular and Mediastinum    Essential hypertension - Primary            Subjective: follow up  On  US  And  X ray     Patient ID: Donald Leal is a 73 y o  male  HPI  He  Has  htn much  Stable    his  Us   Abdomin  Shows  Mild  Hepatomegaly  And  Fatty  Liver       He  Ha s  Vit  D  defeciency  Rt  Knee  X ray  Reviewed   Mild  Arthritis  However  He  Has  No pain  Or  Swelling  Anymore     The following portions of the patient's history were reviewed and updated as appropriate:   Past Medical History:  He has a past medical history of Hyperlipidemia, Hypertension, Seizure disorder ([address] ), Sleep apnea, Tremors of nervous system, and Vitamin D deficiency disease ,  _______________________________________________________________________  Medical Problems:  does not have any pertinent problems on file ,  _______________________________________________________________________  Past Surgical History:   has a past surgical history that includes No past surgeries  ,  _______________________________________________________________________  Family History:  family history includes Colon cancer in his brother; Heart disease in his brother; Stroke in his brother ,  _______________________________________________________________________  Social History:   reports that he has never smoked  He has never used smokeless tobacco  He reports previous alcohol use   He reports that he does not use drugs  ,  _______________________________________________________________________  Allergies:  has No Known Allergies     _______________________________________________________________________  Current Outpatient Medications   Medication Sig Dispense Refill    cholecalciferol (VITAMIN D3) 1,000 units tablet Take 1 tablet (1,000 Units total) by mouth daily 90 tablet 0    ibuprofen (MOTRIN) 400 mg tablet Take 1 tablet (400 mg total) by mouth every 6 (six) hours as needed for mild pain 120 tablet 0    losartan (COZAAR) 100 MG tablet Take 1 tablet (100 mg total) by mouth daily 90 tablet 0    montelukast (SINGULAIR) 10 mg tablet Take 1 tablet (10 mg total) by mouth daily at bedtime 30 tablet 2     No current facility-administered medications for this visit      _______________________________________________________________________  Review of Systems   Constitutional: Negative for chills, fatigue and fever  HENT: Negative for congestion, sinus pressure and sore throat  Eyes: Negative for pain, discharge, redness and itching  Respiratory: Negative for cough and shortness of breath  Cardiovascular: Negative for chest pain, palpitations and leg swelling  Htn   Gastrointestinal: Negative for abdominal distention and abdominal pain  Endocrine: Negative for cold intolerance, heat intolerance, polydipsia and polyphagia  Genitourinary: Negative for flank pain and urgency  Musculoskeletal: Negative for arthralgias, back pain, joint swelling and myalgias  Neurological: Negative for dizziness and headaches  Psychiatric/Behavioral: Negative for self-injury and sleep disturbance  The patient is not nervous/anxious            Objective:  Vitals:    07/07/21 1751   BP: 112/64   BP Location: Left arm   Patient Position: Sitting   Cuff Size: Standard   Pulse: 62   Temp: 98 1 °F (36 7 °C)   TempSrc: Tympanic   SpO2: 98%   Weight: 92 4 kg (203 lb 9 6 oz)   Height: 5' 9" (1 753 m) Body mass index is 30 07 kg/m²  Physical Exam  Vitals and nursing note reviewed  Constitutional:       General: He is not in acute distress  Appearance: Normal appearance  He is not ill-appearing, toxic-appearing or diaphoretic  HENT:      Head: Normocephalic and atraumatic  Nose: Nose normal  No congestion  Mouth/Throat:      Mouth: Mucous membranes are dry  Eyes:      Extraocular Movements: Extraocular movements intact  Conjunctiva/sclera: Conjunctivae normal       Pupils: Pupils are equal, round, and reactive to light  Cardiovascular:      Rate and Rhythm: Normal rate and regular rhythm  Pulses: Normal pulses  Heart sounds: Normal heart sounds  No murmur heard  No gallop  Pulmonary:      Effort: Pulmonary effort is normal       Breath sounds: Normal breath sounds  No wheezing, rhonchi or rales  Abdominal:      General: There is no distension  Palpations: Abdomen is soft  There is no mass  Tenderness: There is no abdominal tenderness  There is no rebound  Musculoskeletal:      Right lower leg: No edema  Left lower leg: No edema  Skin:     Findings: No erythema or rash  Neurological:      General: No focal deficit present  Mental Status: He is alert and oriented to person, place, and time  Psychiatric:         Behavior: Behavior normal          Thought Content:  Thought content normal

## 2021-09-04 DIAGNOSIS — I10 ESSENTIAL HYPERTENSION: ICD-10-CM

## 2021-09-04 DIAGNOSIS — M79.605 PAIN IN POSTERIOR LEFT LOWER EXTREMITY: ICD-10-CM

## 2021-09-07 RX ORDER — VITAMIN B COMPLEX
TABLET ORAL
Qty: 90 TABLET | Refills: 0 | Status: SHIPPED | OUTPATIENT
Start: 2021-09-07

## 2021-09-07 RX ORDER — LOSARTAN POTASSIUM 100 MG/1
TABLET ORAL
Qty: 90 TABLET | Refills: 0 | Status: SHIPPED | OUTPATIENT
Start: 2021-09-07 | End: 2021-12-02

## 2021-12-02 DIAGNOSIS — I10 ESSENTIAL HYPERTENSION: ICD-10-CM

## 2021-12-02 RX ORDER — LOSARTAN POTASSIUM 100 MG/1
TABLET ORAL
Qty: 90 TABLET | Refills: 0 | Status: SHIPPED | OUTPATIENT
Start: 2021-12-02 | End: 2022-01-25 | Stop reason: SDUPTHER

## 2022-01-25 ENCOUNTER — OFFICE VISIT (OUTPATIENT)
Dept: FAMILY MEDICINE CLINIC | Facility: CLINIC | Age: 67
End: 2022-01-25
Payer: COMMERCIAL

## 2022-01-25 VITALS
HEART RATE: 70 BPM | TEMPERATURE: 97.7 F | HEIGHT: 69 IN | WEIGHT: 203.8 LBS | DIASTOLIC BLOOD PRESSURE: 84 MMHG | OXYGEN SATURATION: 98 % | BODY MASS INDEX: 30.18 KG/M2 | SYSTOLIC BLOOD PRESSURE: 140 MMHG

## 2022-01-25 DIAGNOSIS — E55.9 VITAMIN D DEFICIENCY: ICD-10-CM

## 2022-01-25 DIAGNOSIS — M79.605 PAIN IN POSTERIOR LEFT LOWER EXTREMITY: ICD-10-CM

## 2022-01-25 DIAGNOSIS — I10 ESSENTIAL HYPERTENSION: Primary | ICD-10-CM

## 2022-01-25 DIAGNOSIS — J31.0 CHRONIC RHINITIS: ICD-10-CM

## 2022-01-25 PROCEDURE — 3008F BODY MASS INDEX DOCD: CPT | Performed by: FAMILY MEDICINE

## 2022-01-25 PROCEDURE — 99214 OFFICE O/P EST MOD 30 MIN: CPT | Performed by: FAMILY MEDICINE

## 2022-01-25 PROCEDURE — 1036F TOBACCO NON-USER: CPT | Performed by: FAMILY MEDICINE

## 2022-01-25 PROCEDURE — 1160F RVW MEDS BY RX/DR IN RCRD: CPT | Performed by: FAMILY MEDICINE

## 2022-01-25 RX ORDER — MONTELUKAST SODIUM 10 MG/1
10 TABLET ORAL
Qty: 30 TABLET | Refills: 2 | Status: SHIPPED | OUTPATIENT
Start: 2022-01-25 | End: 2022-05-05

## 2022-01-25 RX ORDER — FLUTICASONE PROPIONATE 50 MCG
1 SPRAY, SUSPENSION (ML) NASAL DAILY
Qty: 9.9 ML | Refills: 2 | Status: SHIPPED | OUTPATIENT
Start: 2022-01-25 | End: 2022-03-14

## 2022-01-25 RX ORDER — LOSARTAN POTASSIUM 100 MG/1
100 TABLET ORAL DAILY
Qty: 90 TABLET | Refills: 0 | Status: SHIPPED | OUTPATIENT
Start: 2022-01-25 | End: 2022-08-02 | Stop reason: SDUPTHER

## 2022-01-25 NOTE — PROGRESS NOTES
Assessment/Plan:  As below        Problem List Items Addressed This Visit        Respiratory    Chronic rhinitis     Recurrent  /  Allergic   Not  Getting  Better  They  Have  A dog and a  Parrot  In  Their  House    Added   flonase   He  Is  Taking  Singular  Already   reff  To  Allergist   discussed precautions         Relevant Medications    montelukast (SINGULAIR) 10 mg tablet    fluticasone (FLONASE) 50 mcg/act nasal spray       Cardiovascular and Mediastinum    Essential hypertension - Primary     His  BP  Is  Slightly  High  Today  He  State s that  He  Is  Not  Very  Complaint  In  Taking  His  BP  meds    counseled   Asked  Them  o  Maintain  A BP  Log  And  Call  With  The  Numbers   Will  F/u   Also  He  Is  Due  For  His  labs         Relevant Medications    losartan (COZAAR) 100 MG tablet       Other    Pain in posterior left lower extremity     Much  Improved  now         Vitamin D deficiency     Will  F/u  With  Labs  He  Takes   25  Mcg  Vit  d3  Every  day                 Subjective:      Patient ID: Helena Peterson is a 73 y o  male      HPI-  Came  In  To  Follow up  On  His  Multiple   Chronic  Health  Conditions   He   Has worsening  Allergies    Ha s htn  Is  Non  Complaint    He  Has vit  D  deficiency  Will  F/u  With  Labs leg  Pain  Is  Better  now    The following portions of the patient's history were reviewed and updated as appropriate:   Past Medical History:  He has a past medical history of Hyperlipidemia, Hypertension, Seizure disorder ([address] ), Sleep apnea, Tremors of nervous system, and Vitamin D deficiency disease ,  _______________________________________________________________________  Medical Problems:  does not have any pertinent problems on file ,  _______________________________________________________________________  Past Surgical History:   has a past surgical history that includes No past surgeries  ,  _______________________________________________________________________  Family History:  family history includes Colon cancer in his brother; Heart disease in his brother; Stroke in his brother ,  _______________________________________________________________________  Social History:   reports that he has never smoked  He has never used smokeless tobacco  He reports previous alcohol use  He reports that he does not use drugs  ,  _______________________________________________________________________  Allergies:  has No Known Allergies     _______________________________________________________________________  Current Outpatient Medications   Medication Sig Dispense Refill    cholecalciferol (VITAMIN D3) 25 mcg (1,000 units) tablet TAKE 1 TABLET BY MOUTH EVERY DAY 90 tablet 0    losartan (COZAAR) 100 MG tablet Take 1 tablet (100 mg total) by mouth daily 90 tablet 0    fluticasone (FLONASE) 50 mcg/act nasal spray 1 spray into each nostril daily 9 9 mL 2    ibuprofen (MOTRIN) 400 mg tablet Take 1 tablet (400 mg total) by mouth every 6 (six) hours as needed for mild pain 120 tablet 0    montelukast (SINGULAIR) 10 mg tablet Take 1 tablet (10 mg total) by mouth daily at bedtime 30 tablet 2     No current facility-administered medications for this visit      _______________________________________________________________________  Review of Systems   Constitutional: Negative for chills, fatigue and fever  HENT: Negative for congestion, postnasal drip and sinus pain  Eyes: Negative for pain, discharge and itching  Respiratory: Negative for cough and shortness of breath  Cardiovascular: Negative for chest pain, palpitations and leg swelling  Htn   Gastrointestinal: Negative for abdominal distention, abdominal pain and anal bleeding  Endocrine: Negative for cold intolerance, heat intolerance and polydipsia  Genitourinary: Negative for dysuria and urgency     Musculoskeletal: Positive for arthralgias  Negative for back pain and myalgias  Skin: Negative for rash  Allergic/Immunologic: Positive for environmental allergies  Psychiatric/Behavioral: Negative for self-injury  The patient is not nervous/anxious  Objective:  Vitals:    01/25/22 1722   BP: 140/84   BP Location: Left arm   Patient Position: Sitting   Cuff Size: Standard   Pulse: 70   Temp: 97 7 °F (36 5 °C)   TempSrc: Tympanic   SpO2: 98%   Weight: 92 4 kg (203 lb 12 8 oz)   Height: 5' 9" (1 753 m)     Body mass index is 30 1 kg/m²  Physical Exam  Vitals and nursing note reviewed  Constitutional:       General: He is not in acute distress  Appearance: Normal appearance  He is not ill-appearing, toxic-appearing or diaphoretic  HENT:      Head: Normocephalic and atraumatic  Nose: Nose normal  No congestion  Eyes:      Extraocular Movements: Extraocular movements intact  Pupils: Pupils are equal, round, and reactive to light  Cardiovascular:      Rate and Rhythm: Normal rate and regular rhythm  Heart sounds: Normal heart sounds  No murmur heard  No gallop  Pulmonary:      Effort: Pulmonary effort is normal       Breath sounds: Normal breath sounds  No wheezing, rhonchi or rales  Musculoskeletal:         General: No tenderness  Cervical back: Normal range of motion and neck supple  No tenderness  Right lower leg: No edema  Left lower leg: No edema  Skin:     Findings: No erythema or rash  Neurological:      General: No focal deficit present  Mental Status: He is alert and oriented to person, place, and time

## 2022-01-25 NOTE — ASSESSMENT & PLAN NOTE
His  BP  Is  Slightly  High  Today  He  State s that  He  Is  Not  Very  Complaint  In  Taking  His  BP  meds    counseled   Asked  Them  o  Maintain  A BP  Log  And  Call  With  The  Numbers   Will  F/u   Also  He  Is  Due  For  His  labs

## 2022-01-25 NOTE — ASSESSMENT & PLAN NOTE
Recurrent  /  Allergic   Not  Getting  Better  They  Have  A dog and a  Parrot  In  [employer]    Added   flonase   He  Is  Taking  Singular  Already   reff  To  Allergist   discussed precautions

## 2022-03-13 DIAGNOSIS — J31.0 CHRONIC RHINITIS: ICD-10-CM

## 2022-03-14 RX ORDER — FLUTICASONE PROPIONATE 50 MCG
SPRAY, SUSPENSION (ML) NASAL
Qty: 24 ML | Refills: 1 | Status: SHIPPED | OUTPATIENT
Start: 2022-03-14 | End: 2022-03-28

## 2022-03-28 DIAGNOSIS — J31.0 CHRONIC RHINITIS: ICD-10-CM

## 2022-03-28 RX ORDER — FLUTICASONE PROPIONATE 50 MCG
SPRAY, SUSPENSION (ML) NASAL
Qty: 48 ML | Refills: 1 | Status: SHIPPED | OUTPATIENT
Start: 2022-03-28 | End: 2022-08-02 | Stop reason: SDUPTHER

## 2022-04-24 LAB
25(OH)D3 SERPL-MCNC: 16 NG/ML (ref 30–100)
ALBUMIN SERPL-MCNC: 4.5 G/DL (ref 3.6–5.1)
ALBUMIN/GLOB SERPL: 1.7 (CALC) (ref 1–2.5)
ALP SERPL-CCNC: 91 U/L (ref 35–144)
ALT SERPL-CCNC: 16 U/L (ref 9–46)
AST SERPL-CCNC: 22 U/L (ref 10–35)
BASOPHILS # BLD AUTO: 50 CELLS/UL (ref 0–200)
BASOPHILS NFR BLD AUTO: 0.7 %
BILIRUB SERPL-MCNC: 0.9 MG/DL (ref 0.2–1.2)
BUN SERPL-MCNC: 18 MG/DL (ref 7–25)
BUN/CREAT SERPL: NORMAL (CALC) (ref 6–22)
CALCIUM SERPL-MCNC: 9.2 MG/DL (ref 8.6–10.3)
CHLORIDE SERPL-SCNC: 103 MMOL/L (ref 98–110)
CHOLEST SERPL-MCNC: 142 MG/DL
CHOLEST/HDLC SERPL: 3.8 (CALC)
CO2 SERPL-SCNC: 26 MMOL/L (ref 20–32)
CREAT SERPL-MCNC: 0.82 MG/DL (ref 0.7–1.25)
EOSINOPHIL # BLD AUTO: 230 CELLS/UL (ref 15–500)
EOSINOPHIL NFR BLD AUTO: 3.2 %
ERYTHROCYTE [DISTWIDTH] IN BLOOD BY AUTOMATED COUNT: 12.2 % (ref 11–15)
GLOBULIN SER CALC-MCNC: 2.6 G/DL (CALC) (ref 1.9–3.7)
GLUCOSE SERPL-MCNC: 85 MG/DL (ref 65–99)
HCT VFR BLD AUTO: 44.8 % (ref 38.5–50)
HDLC SERPL-MCNC: 37 MG/DL
HGB BLD-MCNC: 15.1 G/DL (ref 13.2–17.1)
LDLC SERPL CALC-MCNC: 87 MG/DL (CALC)
LYMPHOCYTES # BLD AUTO: 2405 CELLS/UL (ref 850–3900)
LYMPHOCYTES NFR BLD AUTO: 33.4 %
MCH RBC QN AUTO: 30.4 PG (ref 27–33)
MCHC RBC AUTO-ENTMCNC: 33.7 G/DL (ref 32–36)
MCV RBC AUTO: 90.3 FL (ref 80–100)
MONOCYTES # BLD AUTO: 662 CELLS/UL (ref 200–950)
MONOCYTES NFR BLD AUTO: 9.2 %
NEUTROPHILS # BLD AUTO: 3852 CELLS/UL (ref 1500–7800)
NEUTROPHILS NFR BLD AUTO: 53.5 %
NONHDLC SERPL-MCNC: 105 MG/DL (CALC)
PLATELET # BLD AUTO: 222 THOUSAND/UL (ref 140–400)
PMV BLD REES-ECKER: 11 FL (ref 7.5–12.5)
POTASSIUM SERPL-SCNC: 4.1 MMOL/L (ref 3.5–5.3)
PROT SERPL-MCNC: 7.1 G/DL (ref 6.1–8.1)
RBC # BLD AUTO: 4.96 MILLION/UL (ref 4.2–5.8)
SL AMB EGFR AFRICAN AMERICAN: 107 ML/MIN/1.73M2
SL AMB EGFR NON AFRICAN AMERICAN: 92 ML/MIN/1.73M2
SODIUM SERPL-SCNC: 138 MMOL/L (ref 135–146)
TRIGL SERPL-MCNC: 90 MG/DL
WBC # BLD AUTO: 7.2 THOUSAND/UL (ref 3.8–10.8)

## 2022-05-03 ENCOUNTER — OFFICE VISIT (OUTPATIENT)
Dept: FAMILY MEDICINE CLINIC | Facility: CLINIC | Age: 67
End: 2022-05-03
Payer: COMMERCIAL

## 2022-05-03 VITALS
BODY MASS INDEX: 30.3 KG/M2 | OXYGEN SATURATION: 97 % | WEIGHT: 204.6 LBS | TEMPERATURE: 97.8 F | DIASTOLIC BLOOD PRESSURE: 84 MMHG | HEIGHT: 69 IN | SYSTOLIC BLOOD PRESSURE: 126 MMHG | HEART RATE: 70 BPM

## 2022-05-03 DIAGNOSIS — G89.29 CHRONIC PAIN OF RIGHT KNEE: ICD-10-CM

## 2022-05-03 DIAGNOSIS — J31.0 CHRONIC RHINITIS: ICD-10-CM

## 2022-05-03 DIAGNOSIS — E55.9 VITAMIN D DEFICIENCY: ICD-10-CM

## 2022-05-03 DIAGNOSIS — I10 ESSENTIAL HYPERTENSION: Primary | ICD-10-CM

## 2022-05-03 DIAGNOSIS — M25.561 CHRONIC PAIN OF RIGHT KNEE: ICD-10-CM

## 2022-05-03 PROCEDURE — 1036F TOBACCO NON-USER: CPT | Performed by: FAMILY MEDICINE

## 2022-05-03 PROCEDURE — 99214 OFFICE O/P EST MOD 30 MIN: CPT | Performed by: FAMILY MEDICINE

## 2022-05-03 PROCEDURE — 1160F RVW MEDS BY RX/DR IN RCRD: CPT | Performed by: FAMILY MEDICINE

## 2022-05-03 PROCEDURE — 3288F FALL RISK ASSESSMENT DOCD: CPT | Performed by: FAMILY MEDICINE

## 2022-05-03 PROCEDURE — 3008F BODY MASS INDEX DOCD: CPT | Performed by: FAMILY MEDICINE

## 2022-05-03 PROCEDURE — 1101F PT FALLS ASSESS-DOCD LE1/YR: CPT | Performed by: FAMILY MEDICINE

## 2022-05-03 PROCEDURE — 3725F SCREEN DEPRESSION PERFORMED: CPT | Performed by: FAMILY MEDICINE

## 2022-05-03 RX ORDER — MELATONIN
1000 DAILY
Qty: 90 TABLET | Refills: 0 | Status: SHIPPED | OUTPATIENT
Start: 2022-05-03 | End: 2022-08-01

## 2022-05-03 NOTE — PROGRESS NOTES
BMI Counseling: Body mass index is 30 21 kg/m²  The BMI is above normal  Nutrition recommendations include decreasing portion sizes, encouraging healthy choices of fruits and vegetables, decreasing fast food intake, consuming healthier snacks, limiting drinks that contain sugar, moderation in carbohydrate intake, increasing intake of lean protein, reducing intake of saturated and trans fat and reducing intake of cholesterol  Exercise recommendations include moderate physical activity 150 minutes/week  Rationale for BMI follow-up plan is due to patient being overweight or obese  Depression Screening and Follow-up Plan: Patient was screened for depression during today's encounter  They screened negative with a PHQ-2 score of 0

## 2022-05-03 NOTE — ASSESSMENT & PLAN NOTE
Arthritis  -  Discussed  Precautions  And  Care    Return  Parameters  Discussed  He  Declined to  See  The  Orthopedic

## 2022-05-03 NOTE — PROGRESS NOTES
Assessment/Plan:  As  Below -       Problem List Items Addressed This Visit        Respiratory    Chronic rhinitis     He  Is  Taking   Singular   Minor s not  Help  Much  Added   Flonase   Will  f/u            Cardiovascular and Mediastinum    Essential hypertension - Primary     Much  Stable  Now    Asked him  To  Keep a log     Discussed  diet            Other    Chronic pain of right knee     Arthritis  -  Discussed  Precautions  And  Care    Return  Parameters  Discussed  He  Declined to  See  The  Orthopedic          Vitamin D deficiency     His  Labs  Reviewed  Showing  Low  Vit D   Started  On oral  Vit  d3          Relevant Medications    cholecalciferol (VITAMIN D3) 1,000 units tablet            Subjective:      Patient ID: Kwesi Cole is a 78 y o  male  HPI-came  In  To  Follow  Up  On  His   htn   Ans  Labs  Review   His  bp  Is  Stable  His  Labs  Reviewed showing low  Vit  D   His  Allergic  Rhinitis is  Still not   Good   He   Is  Feeling  Better  In  Terms   Of  His  Rt  Knee pain    But  It  Hurts  At  Time s    The following portions of the patient's history were reviewed and updated as appropriate:   Past Medical History:  He has a past medical history of Hyperlipidemia, Hypertension, Seizure disorder ([address] ), Sleep apnea, Tremors of nervous system, and Vitamin D deficiency disease ,  _______________________________________________________________________  Medical Problems:  does not have any pertinent problems on file ,  _______________________________________________________________________  Past Surgical History:   has a past surgical history that includes No past surgeries  ,  _______________________________________________________________________  Family History:  family history includes Colon cancer in his brother; Heart disease in his brother; Stroke in his brother ,  _______________________________________________________________________  Social History:   reports that he has never smoked  He has never used smokeless tobacco  He reports previous alcohol use  He reports that he does not use drugs  ,  _______________________________________________________________________  Allergies:  has No Known Allergies     _______________________________________________________________________  Current Outpatient Medications   Medication Sig Dispense Refill    cholecalciferol (VITAMIN D3) 25 mcg (1,000 units) tablet TAKE 1 TABLET BY MOUTH EVERY DAY 90 tablet 0    fluticasone (FLONASE) 50 mcg/act nasal spray SPRAY 1 SPRAY INTO EACH NOSTRIL EVERY DAY 48 mL 1    losartan (COZAAR) 100 MG tablet Take 1 tablet (100 mg total) by mouth daily 90 tablet 0    montelukast (SINGULAIR) 10 mg tablet Take 1 tablet (10 mg total) by mouth daily at bedtime 30 tablet 2    cholecalciferol (VITAMIN D3) 1,000 units tablet Take 1 tablet (1,000 Units total) by mouth daily 90 tablet 0    ibuprofen (MOTRIN) 400 mg tablet Take 1 tablet (400 mg total) by mouth every 6 (six) hours as needed for mild pain 120 tablet 0     No current facility-administered medications for this visit      _______________________________________________________________________  Review of Systems   Constitutional: Positive for fatigue  Negative for chills and fever  HENT: Negative for congestion, postnasal drip and sinus pain  Eyes: Negative for pain, discharge, redness and itching  Respiratory: Negative for cough and shortness of breath  Cardiovascular: Negative for chest pain, palpitations and leg swelling  Htn   Gastrointestinal: Negative for abdominal distention, abdominal pain and constipation  Endocrine: Negative for cold intolerance, heat intolerance, polydipsia and polyphagia  Genitourinary: Negative for dysuria and urgency  Musculoskeletal: Positive for arthralgias  Negative for back pain  Skin: Negative for rash and wound  Neurological: Negative for dizziness and headaches     Psychiatric/Behavioral: Negative for sleep disturbance  The patient is not nervous/anxious  Objective:  Vitals:    05/03/22 1751   BP: 126/84   BP Location: Left arm   Patient Position: Sitting   Cuff Size: Large   Pulse: 70   Temp: 97 8 °F (36 6 °C)   TempSrc: Tympanic   SpO2: 97%   Weight: 92 8 kg (204 lb 9 6 oz)   Height: 5' 9" (1 753 m)     Body mass index is 30 21 kg/m²  Physical Exam  Vitals and nursing note reviewed  Constitutional:       General: He is not in acute distress  Appearance: Normal appearance  He is normal weight  He is not ill-appearing, toxic-appearing or diaphoretic  HENT:      Head: Normocephalic and atraumatic  Nose: Nose normal  No congestion  Mouth/Throat:      Mouth: Mucous membranes are moist    Cardiovascular:      Rate and Rhythm: Normal rate and regular rhythm  Pulses: Normal pulses  Heart sounds: Normal heart sounds  No murmur heard  No gallop  Pulmonary:      Effort: Pulmonary effort is normal       Breath sounds: Normal breath sounds  No wheezing, rhonchi or rales  Abdominal:      General: There is no distension  Palpations: Abdomen is soft  Tenderness: There is no abdominal tenderness  There is no right CVA tenderness or left CVA tenderness  Musculoskeletal:         General: Normal range of motion  Cervical back: Normal range of motion and neck supple  No rigidity  Right lower leg: No edema  Left lower leg: No edema  Skin:     Findings: No erythema or rash  Neurological:      General: No focal deficit present  Mental Status: He is alert and oriented to person, place, and time  Psychiatric:         Mood and Affect: Mood normal          Behavior: Behavior normal          Thought Content:  Thought content normal

## 2022-05-05 DIAGNOSIS — J31.0 CHRONIC RHINITIS: ICD-10-CM

## 2022-05-05 RX ORDER — MONTELUKAST SODIUM 10 MG/1
TABLET ORAL
Qty: 90 TABLET | Refills: 1 | Status: SHIPPED | OUTPATIENT
Start: 2022-05-05 | End: 2022-08-02 | Stop reason: SDUPTHER

## 2022-08-02 ENCOUNTER — TELEMEDICINE (OUTPATIENT)
Dept: FAMILY MEDICINE CLINIC | Facility: CLINIC | Age: 67
End: 2022-08-02
Payer: COMMERCIAL

## 2022-08-02 DIAGNOSIS — J31.0 CHRONIC RHINITIS: ICD-10-CM

## 2022-08-02 DIAGNOSIS — G89.29 CHRONIC PAIN OF RIGHT KNEE: ICD-10-CM

## 2022-08-02 DIAGNOSIS — E55.9 VITAMIN D DEFICIENCY: ICD-10-CM

## 2022-08-02 DIAGNOSIS — I10 ESSENTIAL HYPERTENSION: Primary | ICD-10-CM

## 2022-08-02 DIAGNOSIS — J30.1 SEASONAL ALLERGIC RHINITIS DUE TO POLLEN: ICD-10-CM

## 2022-08-02 DIAGNOSIS — M25.561 CHRONIC PAIN OF RIGHT KNEE: ICD-10-CM

## 2022-08-02 PROCEDURE — 99214 OFFICE O/P EST MOD 30 MIN: CPT | Performed by: FAMILY MEDICINE

## 2022-08-02 PROCEDURE — 1160F RVW MEDS BY RX/DR IN RCRD: CPT | Performed by: FAMILY MEDICINE

## 2022-08-02 RX ORDER — LOSARTAN POTASSIUM 100 MG/1
100 TABLET ORAL DAILY
Qty: 90 TABLET | Refills: 0 | Status: SHIPPED | OUTPATIENT
Start: 2022-08-02 | End: 2022-10-31

## 2022-08-02 RX ORDER — FLUTICASONE PROPIONATE 50 MCG
2 SPRAY, SUSPENSION (ML) NASAL DAILY
Qty: 48 ML | Refills: 1 | Status: SHIPPED | OUTPATIENT
Start: 2022-08-02 | End: 2022-10-31

## 2022-08-02 RX ORDER — MONTELUKAST SODIUM 10 MG/1
10 TABLET ORAL
Qty: 90 TABLET | Refills: 1 | Status: SHIPPED | OUTPATIENT
Start: 2022-08-02 | End: 2022-10-31

## 2022-08-02 NOTE — PROGRESS NOTES
Virtual Regular Visit    Verification of patient location:    Patient is located in the following state in which I hold an active license [place]      Assessment/Plan:as  below    Problem List Items Addressed This Visit        Respiratory    Chronic rhinitis    Relevant Medications    fluticasone (FLONASE) 50 mcg/act nasal spray    montelukast (SINGULAIR) 10 mg tablet    Seasonal allergic rhinitis due to pollen     Much  Stable  On  Flonase  And  singular            Cardiovascular and Mediastinum    Essential hypertension - Primary     Much  Stable   Reviewed  The  Log well  In  Limits   Discussed  diet         Relevant Medications    losartan (COZAAR) 100 MG tablet    Other Relevant Orders    CBC and differential    Lipid panel    Comprehensive metabolic panel       Other    Chronic pain of right knee     Improved  Now  Not  All the  Time  Now  Discussed  Precautions  And  care         Vitamin D deficiency     He  Takes  Oral  Vit  D  Will  F/u  With  labs         Relevant Orders    Vitamin D 25 hydroxy               Reason for visit is   Chief Complaint   Patient presents with    Virtual Regular Visit        Encounter provider Cuauhtemoc Nunez MD    Provider located at [address] [place] [id number] [phone]      Recent Visits  No visits were found meeting these conditions  Showing recent visits within past 7 days and meeting all other requirements  Today's Visits  Date Type Provider Dept   08/02/22 Telemedicine Cuauhtemoc Nunez MD [address] today's visits and meeting all other requirements  Future Appointments  No visits were found meeting these conditions  Showing future appointments within next 150 days and meeting all other requirements       The patient was identified by name and date of birth   Sugey Pham was informed that this is a telemedicine visit and that the visit is being conducted through [employer] and patient was informed that this is a secure, HIPAA-compliant platform  He agrees to proceed     My office door was closed  No one else was in the room  He acknowledged consent and understanding of privacy and security of the video platform  The patient has agreed to participate and understands they can discontinue the visit at any time  Patient is aware this is a billable service  Subjective  Ana Dobbins is a 78 y o  male       HPI I  Had  A  Video  Visit  With  The  Patient  To  Go  Over  His  Multiple  Health  Conditions  Listed  Above   His  bp  Is  Much  Stable also  His  Knee  Pain  Is  Better  No  Leg  Pains  Any  More   His  Rhinitis  Is  Stable   Will  F/u  With  Vit  d3     Past Medical History:   Diagnosis Date    Hyperlipidemia     Hypertension     Seizure disorder ([address] )     Sleep apnea     Tremors of nervous system     per [facility] Vitamin D deficiency disease        Past Surgical History:   Procedure Laterality Date    NO PAST SURGERIES         Current Outpatient Medications   Medication Sig Dispense Refill    fluticasone (FLONASE) 50 mcg/act nasal spray 2 sprays into each nostril daily 48 mL 1    losartan (COZAAR) 100 MG tablet Take 1 tablet (100 mg total) by mouth daily 90 tablet 0    montelukast (SINGULAIR) 10 mg tablet Take 1 tablet (10 mg total) by mouth daily at bedtime 90 tablet 1    cholecalciferol (VITAMIN D3) 1,000 units tablet Take 1 tablet (1,000 Units total) by mouth daily 90 tablet 0    cholecalciferol (VITAMIN D3) 25 mcg (1,000 units) tablet TAKE 1 TABLET BY MOUTH EVERY DAY 90 tablet 0    ibuprofen (MOTRIN) 400 mg tablet Take 1 tablet (400 mg total) by mouth every 6 (six) hours as needed for mild pain 120 tablet 0     No current facility-administered medications for this visit  No Known Allergies    Review of Systems   Constitutional: Negative for chills and fatigue  HENT: Negative for congestion and postnasal drip      Eyes: Negative for pain, discharge, redness and itching  Respiratory: Negative for cough, chest tightness and shortness of breath  Cardiovascular: Negative for chest pain, palpitations and leg swelling  Htn   Gastrointestinal: Negative for abdominal distention, abdominal pain and anal bleeding  Endocrine: Negative for cold intolerance, heat intolerance, polydipsia and polyphagia  Genitourinary: Negative for dysuria and frequency  Musculoskeletal: Positive for arthralgias  Negative for back pain, gait problem and joint swelling  Skin: Negative for rash  Neurological: Negative for dizziness and headaches  Psychiatric/Behavioral: Negative for sleep disturbance and suicidal ideas  The patient is not nervous/anxious  Video Exam    There were no vitals filed for this visit  Physical Exam  Constitutional:       General: He is not in acute distress  Appearance: Normal appearance  He is not ill-appearing, toxic-appearing or diaphoretic  HENT:      Head: Normocephalic and atraumatic  Nose: Nose normal  No congestion  Eyes:      Extraocular Movements: Extraocular movements intact  Conjunctiva/sclera: Conjunctivae normal       Pupils: Pupils are equal, round, and reactive to light  Pulmonary:      Effort: Pulmonary effort is normal    Musculoskeletal:         General: No swelling or deformity  Cervical back: Normal range of motion  No rigidity or tenderness  Right lower leg: No edema  Left lower leg: No edema  Lymphadenopathy:      Cervical: No cervical adenopathy  Skin:     Findings: No erythema or rash  Neurological:      General: No focal deficit present  Mental Status: He is alert and oriented to person, place, and time     Psychiatric:         Mood and Affect: Mood normal          Behavior: Behavior normal           I spent 30 minutes minutes directly with the patient during this visit    Pradeep Mathews verbally agrees to participate in Virtual Care Services  Pt is aware that [employer] could be limited without vital signs or the ability to perform a full hands-on physical exam  Dawit Segura understands he or the provider may request at any time to terminate the video visit and request the patient to seek care or treatment in person

## 2022-10-12 PROBLEM — Z00.00 ROUTINE MEDICAL EXAM: Status: RESOLVED | Noted: 2021-06-09 | Resolved: 2022-10-12

## 2023-03-14 ENCOUNTER — OFFICE VISIT (OUTPATIENT)
Dept: FAMILY MEDICINE CLINIC | Facility: CLINIC | Age: 68
End: 2023-03-14

## 2023-03-14 VITALS
HEIGHT: 69 IN | HEART RATE: 82 BPM | OXYGEN SATURATION: 97 % | TEMPERATURE: 97.2 F | SYSTOLIC BLOOD PRESSURE: 130 MMHG | WEIGHT: 196.2 LBS | RESPIRATION RATE: 16 BRPM | BODY MASS INDEX: 29.06 KG/M2 | DIASTOLIC BLOOD PRESSURE: 84 MMHG

## 2023-03-14 DIAGNOSIS — E55.9 VITAMIN D DEFICIENCY: ICD-10-CM

## 2023-03-14 DIAGNOSIS — Z00.00 ROUTINE MEDICAL EXAM: Primary | ICD-10-CM

## 2023-03-14 DIAGNOSIS — Z00.00 ANNUAL PHYSICAL EXAM: ICD-10-CM

## 2023-03-14 DIAGNOSIS — J31.0 CHRONIC RHINITIS: ICD-10-CM

## 2023-03-14 DIAGNOSIS — Z12.11 SCREENING FOR COLON CANCER: ICD-10-CM

## 2023-03-14 DIAGNOSIS — I10 ESSENTIAL HYPERTENSION: ICD-10-CM

## 2023-03-14 DIAGNOSIS — M79.605 PAIN IN POSTERIOR LEFT LOWER EXTREMITY: ICD-10-CM

## 2023-03-14 DIAGNOSIS — Z12.5 SCREENING FOR PROSTATE CANCER: ICD-10-CM

## 2023-03-14 DIAGNOSIS — R10.84 ABDOMINAL PAIN, GENERALIZED: ICD-10-CM

## 2023-03-14 DIAGNOSIS — R53.83 OTHER FATIGUE: ICD-10-CM

## 2023-03-14 RX ORDER — MELATONIN
1000 DAILY
Qty: 90 TABLET | Refills: 0 | Status: SHIPPED | OUTPATIENT
Start: 2023-03-14 | End: 2023-06-12

## 2023-03-14 RX ORDER — LOSARTAN POTASSIUM 100 MG/1
100 TABLET ORAL DAILY
Qty: 90 TABLET | Refills: 0 | Status: SHIPPED | OUTPATIENT
Start: 2023-03-14 | End: 2023-03-14 | Stop reason: SDUPTHER

## 2023-03-14 RX ORDER — MONTELUKAST SODIUM 10 MG/1
10 TABLET ORAL
Qty: 90 TABLET | Refills: 1 | Status: SHIPPED | OUTPATIENT
Start: 2023-03-14 | End: 2023-06-12

## 2023-03-14 RX ORDER — FLUTICASONE PROPIONATE 50 MCG
2 SPRAY, SUSPENSION (ML) NASAL DAILY
Qty: 48 ML | Refills: 1 | Status: SHIPPED | OUTPATIENT
Start: 2023-03-14 | End: 2023-06-12

## 2023-03-14 RX ORDER — VITAMIN B COMPLEX
1000 TABLET ORAL DAILY
Qty: 90 TABLET | Refills: 0 | Status: SHIPPED | OUTPATIENT
Start: 2023-03-14 | End: 2023-06-12

## 2023-03-14 RX ORDER — LOSARTAN POTASSIUM 100 MG/1
100 TABLET ORAL DAILY
Qty: 90 TABLET | Refills: 0 | Status: SHIPPED | OUTPATIENT
Start: 2023-03-14 | End: 2023-06-12

## 2023-03-14 RX ORDER — MONTELUKAST SODIUM 10 MG/1
10 TABLET ORAL
Qty: 90 TABLET | Refills: 1 | Status: SHIPPED | OUTPATIENT
Start: 2023-03-14 | End: 2023-03-14 | Stop reason: SDUPTHER

## 2023-03-14 NOTE — PROGRESS NOTES
ADULT ANNUAL PHYSICAL  [address] [facility] [place]    NAME: Dawit Segura  AGE: 80 y o  SEX: male  : 1955     DATE: 3/14/2023     Assessment and Plan:routeine  Medical  Exam and other  Health  Conditions -as  below     Problem List Items Addressed This Visit        Respiratory    Chronic rhinitis     Stable  now         Relevant Medications    montelukast (SINGULAIR) 10 mg tablet    fluticasone (FLONASE) 50 mcg/act nasal spray       Cardiovascular and Mediastinum    Essential hypertension     Much  Stable  Keep logging         Relevant Medications    losartan (COZAAR) 100 MG tablet       Other    Pain in posterior left lower extremity    Relevant Medications    cholecalciferol (VITAMIN D3) 25 mcg (1,000 units) tablet    Vitamin D deficiency    Relevant Medications    cholecalciferol (VITAMIN D3) 1,000 units tablet    Routine medical exam - Primary     Discussed  Diet  Exercise  And life  Style  Modifications   Will  F/u  With  Labs will get  The  cologard    no  Complaints today            Immunizations and preventive care screenings were discussed with patient today  Appropriate education was printed on patient's after visit summary  Discussed risks and benefits of prostate cancer screening  We discussed the controversial history of PSA screening for prostate cancer in the [place] as well as the risk of over detection and over treatment of prostate cancer by way of PSA screening  The patient understands that PSA blood testing is an imperfect way to screen for prostate cancer and that elevated PSA levels in the blood may also be caused by infection, inflammation, prostatic trauma or manipulation, urological procedures, or by benign prostatic enlargement      The role of the digital rectal examination in prostate cancer screening was also discussed and I discussed with him that there is large interobserver variability in the findings of digital rectal examination  Counseling:  · Dental Health: discussed importance of regular tooth brushing, flossing, and dental visits  BMI Counseling: Body mass index is 28 97 kg/m²  The BMI is above normal  Nutrition recommendations include decreasing portion sizes, encouraging healthy choices of fruits and vegetables, decreasing fast food intake, consuming healthier snacks, limiting drinks that contain sugar, moderation in carbohydrate intake, increasing intake of lean protein, reducing intake of saturated and trans fat and reducing intake of cholesterol  Rationale for BMI follow-up plan is due to patient being overweight or obese  Depression Screening and Follow-up Plan: Patient was screened for depression during today's encounter  They screened negative with a PHQ-2 score of 0  Return in about 3 months (around 6/14/2023)  Chief Complaint:riuteine  Medical  Exam and  Other  Health  Conditions -  He  Is  C/o  gen  Abdominal pains  At times  No pain  currently     Chief Complaint   Patient presents with   • Eye Problem     Patient stated he noticed on his right eye he had a pimple but is unsure what the little lump on his eye could be  • Abdominal Pain     Patient stated he is having abdominal pain on his sides and comes and goes  Pt said its been going on for couple months now  History of Present Illness:     Adult Annual Physical   Patient here for a comprehensive physical exam  The patient reports as  listed  Diet and Physical Activity  · Diet/Nutrition: well balanced diet  · Exercise: walking  Depression Screening  PHQ-2/9 Depression Screening    Little interest or pleasure in doing things: 0 - not at all  Feeling down, depressed, or hopeless: 0 - not at all  PHQ-2 Score: 0  PHQ-2 Interpretation: Negative depression screen       General Health  · Sleep: sleeps well  · Hearing: normal - bilateral   · Vision: no vision problems  · Dental: regular dental visits          Health  · Symptoms include: none     Review of Systems:     Review of Systems   Constitutional: Negative for chills and fatigue  HENT: Negative for congestion and postnasal drip  Eyes: Negative for pain, discharge and itching  Respiratory: Negative for cough and shortness of breath  Cardiovascular: Negative for chest pain, palpitations and leg swelling  Htn   Gastrointestinal: Negative for abdominal distention, abdominal pain, blood in stool and constipation  Endocrine: Negative for cold intolerance, polydipsia and polyphagia  Genitourinary: Negative for dysuria and flank pain  Musculoskeletal: Negative for arthralgias and back pain  Allergic/Immunologic: Positive for environmental allergies  Neurological: Negative for dizziness, numbness and headaches  Psychiatric/Behavioral: Negative for self-injury, sleep disturbance and suicidal ideas  The patient is not nervous/anxious         Past Medical History:     Past Medical History:   Diagnosis Date   • Hyperlipidemia    • Hypertension    • Seizure disorder ([facility] [address] )    • Sleep apnea    • Tremors of nervous system     per [facility]   • Vitamin D deficiency disease       Past Surgical History:     Past Surgical History:   Procedure Laterality Date   • NO PAST SURGERIES        Family History:     Family History   Problem Relation Age of Onset   • Colon cancer Brother    • Stroke Brother    • Heart disease Brother         pacemaker in situ      Social History:     Social History     Socioeconomic History   • Marital status: Single     Spouse name: None   • Number of children: None   • Years of education: 12   • Highest education level: High school graduate   Occupational History   • Occupation:    Tobacco Use   • Smoking status: Never   • Smokeless tobacco: Never   Vaping Use   • Vaping Use: Never used   Substance and Sexual Activity   • Alcohol use: Not Currently     Comment: No use    • Drug use: Never     Comment: No use   • Sexual activity: None   Other Topics Concern   • None   Social History Narrative    · Most recent tobacco use screenin2020      · Were you activated, into active duty, as a member of the [employer] or as a Reservist:   No       · Sexual orientation:   Heterosexual      · Exercise level:   None      · Diet:   Regular      · General stress level:   Low       · Caffeine intake:   Heavy    · Guns present in home:   No      · Seat belts used routinely:   Yes      · Smoke alarm in home: Yes      · Advance directive:   No      Social Determinants of Health     Financial Resource Strain: Not on file   Food Insecurity: Not on file   Transportation Needs: Not on file   Physical Activity: Not on file   Stress: Not on file   Social Connections: Not on file   Intimate Partner Violence: Not on file   Housing Stability: Not on file      Current Medications:     Current Outpatient Medications   Medication Sig Dispense Refill   • cholecalciferol (VITAMIN D3) 1,000 units tablet Take 1 tablet (1,000 Units total) by mouth daily 90 tablet 0   • cholecalciferol (VITAMIN D3) 25 mcg (1,000 units) tablet Take 1 tablet (1,000 Units total) by mouth daily 90 tablet 0   • fluticasone (FLONASE) 50 mcg/act nasal spray 2 sprays into each nostril daily 48 mL 1   • losartan (COZAAR) 100 MG tablet Take 1 tablet (100 mg total) by mouth daily 90 tablet 0   • montelukast (SINGULAIR) 10 mg tablet Take 1 tablet (10 mg total) by mouth daily at bedtime 90 tablet 1   • ibuprofen (MOTRIN) 400 mg tablet Take 1 tablet (400 mg total) by mouth every 6 (six) hours as needed for mild pain (Patient not taking: Reported on 3/14/2023) 120 tablet 0     No current facility-administered medications for this visit        Allergies:     No Known Allergies   Physical Exam:     /84 (BP Location: Left arm, Patient Position: Sitting, Cuff Size: Adult)   Pulse 82   Temp (!) 97 2 °F (36 2 °C) (Temporal)   Resp 16   Ht 5' 9" (1 753 m)   Wt 89 kg (196 lb 3 2 oz)   SpO2 97%   BMI 28 97 kg/m²     Physical Exam  Vitals and nursing note reviewed  Constitutional:       General: He is not in acute distress  Appearance: He is well-developed  He is not ill-appearing, toxic-appearing or diaphoretic  HENT:      Head: Normocephalic  Mouth/Throat:      Mouth: Mucous membranes are moist       Pharynx: No pharyngeal swelling  Eyes:      Extraocular Movements: Extraocular movements intact  Pupils: Pupils are equal, round, and reactive to light  Cardiovascular:      Rate and Rhythm: Normal rate and regular rhythm  Heart sounds: Normal heart sounds  No murmur heard  No friction rub  Pulmonary:      Effort: Pulmonary effort is normal       Breath sounds: Normal breath sounds  No wheezing, rhonchi or rales  Abdominal:      General: Bowel sounds are normal  There is no distension  Palpations: Abdomen is soft  There is no shifting dullness, fluid wave, hepatomegaly, splenomegaly or mass  Tenderness: There is no abdominal tenderness  Hernia: There is no hernia in the umbilical area, ventral area, left inguinal area or right inguinal area  Skin:     Coloration: Skin is not pale  Findings: No rash  Neurological:      Mental Status: He is alert            Hanna Aase, MD  [facility]

## 2023-03-14 NOTE — ASSESSMENT & PLAN NOTE
Discussed  Diet  Exercise  And life  Style  Modifications   Will  F/u  With  Labs will get  The  cologard    no  Complaints today

## 2023-03-14 NOTE — PATIENT INSTRUCTIONS
Wellness Visit for Adults   AMBULATORY CARE:   A wellness visit  is when you see your healthcare provider to get screened for health problems  Your healthcare provider will also give you advice on how to stay healthy  Write down your questions so you remember to ask them  Ask your healthcare provider how often you should have a wellness visit  What happens at a wellness visit:  Your healthcare provider will ask about your health, and your family history of health problems  This includes high blood pressure, heart disease, and cancer  He or she will ask if you have symptoms that concern you, if you smoke, and about your mood  You may also be asked about your intake of medicines, supplements, food, and alcohol  Any of the following may be done:  • Your weight  will be checked  Your height may also be checked so your body mass index (BMI) can be calculated  Your BMI shows if you are at a healthy weight  • Your blood pressure  and heart rate will be checked  Your temperature may also be checked  • Blood and urine tests  may be done  Blood tests may be done to check your cholesterol levels  Abnormal cholesterol levels increase your risk for heart disease and stroke  You may also need a blood or urine test to check for diabetes if you are at increased risk  Urine tests may be done to look for signs of an infection or kidney disease  • A physical exam  includes checking your heartbeat and lungs with a stethoscope  Your healthcare provider may also check your skin to look for sun damage  • Screening tests  may be recommended  A screening test is done to check for diseases that may not cause symptoms  The screening tests you may need depend on your age, gender, family history, and lifestyle habits  For example, colorectal screening may be recommended if you are 48years old or older  Screening tests you need if you are a woman:   • A Pap smear  is used to screen for cervical cancer   Pap smears are usually done every 3 to 5 years depending on your age  You may need them more often if you have had abnormal Pap smear test results in the past  Ask your healthcare provider how often you should have a Pap smear  • A mammogram  is an x-ray of your breasts to screen for breast cancer  Experts recommend mammograms every 2 years starting at age 48 years  You may need a mammogram at age 52 years or younger if you have an increased risk for breast cancer  Talk to your healthcare provider about when you should start having mammograms and how often you need them  Vaccines you may need:   • Get an influenza vaccine  every year  The influenza vaccine protects you from the flu  Several types of viruses cause the flu  The viruses change over time, so new vaccines are made each year  • Get a tetanus-diphtheria (Td) booster vaccine  every 10 years  This vaccine protects you against tetanus and diphtheria  Tetanus is a severe infection that may cause painful muscle spasms and lockjaw  Diphtheria is a severe bacterial infection that causes a thick covering in the back of your mouth and throat  • Get a human papillomavirus (HPV) vaccine  if you are female and aged 23 to 32 or male 23 to 24 and never received it  This vaccine protects you from HPV infection  HPV is the most common infection spread by sexual contact  HPV may also cause vaginal, penile, and anal cancers  • Get a pneumococcal vaccine  if you are aged 72 years or older  The pneumococcal vaccine is an injection given to protect you from pneumococcal disease  Pneumococcal disease is an infection caused by pneumococcal bacteria  The infection may cause pneumonia, meningitis, or an ear infection  • Get a shingles vaccine  if you are 60 or older, even if you have had shingles before  The shingles vaccine is an injection to protect you from the varicella-zoster virus  This is the same virus that causes chickenpox   Shingles is a painful rash that develops in people who had chickenpox or have been exposed to the virus  How to eat healthy:  My Plate is a model for planning healthy meals  It shows the types and amounts of foods that should go on your plate  Fruits and vegetables make up about half of your plate, and grains and protein make up the other half  A serving of dairy is included on the side of your plate  The amount of calories and serving sizes you need depends on your age, gender, weight, and height  Examples of healthy foods are listed below:  • Eat a variety of vegetables  such as dark green, red, and orange vegetables  You can also include canned vegetables low in sodium (salt) and frozen vegetables without added butter or sauces  • Eat a variety of fresh fruits , canned fruit in 100% juice, frozen fruit, and dried fruit  • Include whole grains  At least half of the grains you eat should be whole grains  Examples include whole-wheat bread, wheat pasta, brown rice, and whole-grain cereals such as oatmeal     • Eat a variety of protein foods such as seafood (fish and shellfish), lean meat, and poultry without skin (turkey and chicken)  Examples of lean meats include pork leg, shoulder, or tenderloin, and beef round, sirloin, tenderloin, and extra lean ground beef  Other protein foods include eggs and egg substitutes, beans, peas, soy products, nuts, and seeds  • Choose low-fat dairy products such as skim or 1% milk or low-fat yogurt, cheese, and cottage cheese  • Limit unhealthy fats  such as butter, hard margarine, and shortening  Exercise:  Exercise at least 30 minutes per day on most days of the week  Some examples of exercise include walking, biking, dancing, and swimming  You can also fit in more physical activity by taking the stairs instead of the elevator or parking farther away from stores  Include muscle strengthening activities 2 days each week  Regular exercise provides many health benefits   It helps you manage your weight, and decreases your risk for type 2 diabetes, heart disease, stroke, and high blood pressure  Exercise can also help improve your mood  Ask your healthcare provider about the best exercise plan for you  General health and safety guidelines:   • Do not smoke  Nicotine and other chemicals in cigarettes and cigars can cause lung damage  Ask your healthcare provider for information if you currently smoke and need help to quit  E-cigarettes or smokeless tobacco still contain nicotine  Talk to your healthcare provider before you use these products  • Limit alcohol  A drink of alcohol is 12 ounces of beer, 5 ounces of wine, or 1½ ounces of liquor  • Lose weight, if needed  Being overweight increases your risk of certain health conditions  These include heart disease, high blood pressure, type 2 diabetes, and certain types of cancer  • Protect your skin  Do not sunbathe or use tanning beds  Use sunscreen with a SPF 15 or higher  Apply sunscreen at least 15 minutes before you go outside  Reapply sunscreen every 2 hours  Wear protective clothing, hats, and sunglasses when you are outside  • Drive safely  Always wear your seatbelt  Make sure everyone in your car wears a seatbelt  A seatbelt can save your life if you are in an accident  Do not use your cell phone when you are driving  This could distract you and cause an accident  Pull over if you need to make a call or send a text message  • Practice safe sex  Use latex condoms if are sexually active and have more than one partner  Your healthcare provider may recommend screening tests for sexually transmitted infections (STIs)  • Wear helmets, lifejackets, and protective gear  Always wear a helmet when you ride a bike or motorcycle, go skiing, or play sports that could cause a head injury  Wear protective equipment when you play sports  Wear a lifejacket when you are on a boat or doing water sports      © Copyright [employer] 2022 Information is for End User's use only and may not be sold, redistributed or otherwise used for commercial purposes  The above information is an  only  It is not intended as medical advice for individual conditions or treatments  Talk to your doctor, nurse or pharmacist before following any medical regimen to see if it is safe and effective for you  Weight Management   AMBULATORY CARE:   Why it is important to manage your weight:  Being overweight increases your risk of health conditions such as heart disease, high blood pressure, type 2 diabetes, and certain types of cancer  It can also increase your risk for osteoarthritis, sleep apnea, and other respiratory problems  Aim for a slow, steady weight loss  Even a small amount of weight loss can lower your risk of health problems  Risks of being overweight:  Extra weight can cause many health problems, including the following:  • Diabetes (high blood sugar level)    • High blood pressure or high cholesterol    • Heart disease    • Stroke    • Gallbladder or liver disease    • Cancer of the colon, breast, prostate, liver, or kidney    • Sleep apnea    • Arthritis or gout    Screening  is done to check for health conditions before you have signs or symptoms  If you are 28to 79years old, your blood sugar level may be checked every 3 years for signs of prediabetes or diabetes  Your healthcare provider will check your blood pressure at each visit  High blood pressure can lead to a stroke or other problems  Your provider may check for signs of heart disease, cancer, or other health problems  How to lose weight safely:  A safe and healthy way to lose weight is to eat fewer calories and get regular exercise  • You can lose up about 1 pound a week by decreasing the number of calories you eat by 500 calories each day  You can decrease calories by eating smaller portion sizes or by cutting out high-calorie foods   Read labels to find out how many calories are in the foods you eat          • You can also burn calories with exercise such as walking, swimming, or biking  You will be more likely to keep weight off if you make these changes part of your lifestyle  Exercise at least 30 minutes per day on most days of the week  You can also fit in more physical activity by taking the stairs instead of the elevator or parking farther away from stores  Ask your healthcare provider about the best exercise plan for you  Healthy meal plan for weight management:  A healthy meal plan includes a variety of foods, contains fewer calories, and helps you stay healthy  A healthy meal plan includes the following:     • Eat whole-grain foods more often  A healthy meal plan should contain fiber  Fiber is the part of grains, fruits, and vegetables that is not broken down by your body  Whole-grain foods are healthy and provide extra fiber in your diet  Some examples of whole-grain foods are whole-wheat breads and pastas, oatmeal, brown rice, and bulgur  • Eat a variety of vegetables every day  Include dark, leafy greens such as spinach, kale, scott greens, and mustard greens  Eat yellow and orange vegetables such as carrots, sweet potatoes, and winter squash  • Eat a variety of fruits every day  Choose fresh or canned fruit (canned in its own juice or light syrup) instead of juice  Fruit juice has very little or no fiber  • Eat low-fat dairy foods  Drink fat-free (skim) milk or 1% milk  Eat fat-free yogurt and low-fat cottage cheese  Try low-fat cheeses such as mozzarella and other reduced-fat cheeses  • Choose meat and other protein foods that are low in fat  Choose beans or other legumes such as split peas or lentils  Choose fish, skinless poultry (chicken or turkey), or lean cuts of red meat (beef or pork)  Before you cook meat or poultry, cut off any visible fat  • Use less fat and oil  Try baking foods instead of frying them   Add less fat, such as margarine, sour cream, regular salad dressing and mayonnaise to foods  Eat fewer high-fat foods  Some examples of high-fat foods include french fries, doughnuts, ice cream, and cakes  • Eat fewer sweets  Limit foods and drinks that are high in sugar  This includes candy, cookies, regular soda, and sweetened drinks  Ways to decrease calories:   • Eat smaller portions  ? Use a small plate with smaller servings  ? Do not eat second helpings  ? When you eat at a restaurant, ask for a box and place half of your meal in the box before you eat  ? Share an entrée with someone else  • Replace high-calorie snacks with healthy, low-calorie snacks  ? Choose fresh fruit, vegetables, fat-free rice cakes, or air-popped popcorn instead of potato chips, nuts, or chocolate  ? Choose water or calorie-free drinks instead of soda or sweetened drinks  • Do not shop for groceries when you are hungry  You may be more likely to make unhealthy food choices  Take a grocery list of healthy foods and shop after you have eaten  • Eat regular meals  Do not skip meals  Skipping meals can lead to overeating later in the day  This can make it harder for you to lose weight  Eat a healthy snack in place of a meal if you do not have time to eat a regular meal  Talk with a dietitian to help you create a meal plan and schedule that is right for you  Other things to consider as you try to lose weight:   • Be aware of situations that may give you the urge to overeat, such as eating while watching television  Find ways to avoid these situations  For example, read a book, go for a walk, or do crafts  • Meet with a weight loss support group or friends who are also trying to lose weight  This may help you stay motivated to continue working on your weight loss goals  © Copyright [employer] Most 2022 Information is for End User's use only and may not be sold, redistributed or otherwise used for commercial purposes    The above information is an  only  It is not intended as medical advice for individual conditions or treatments  Talk to your doctor, nurse or pharmacist before following any medical regimen to see if it is safe and effective for you  Cholesterol and Your Health   AMBULATORY CARE:   Cholesterol  is a waxy, fat-like substance  Your body uses cholesterol to make hormones and new cells, and to protect nerves  Cholesterol is made by your body  It also comes from certain foods you eat, such as meat and dairy products  Your healthcare provider can help you set goals for your cholesterol levels  He or she can help you create a plan to meet your goals  Cholesterol level goals: Your cholesterol level goals depend on your risk for heart disease, your age, and your other health conditions  The following are general guidelines:  • Total cholesterol  includes low-density lipoprotein (LDL), high-density lipoprotein (HDL), and triglyceride levels  The total cholesterol level should be lower than 200 mg/dL and is best at about 150 mg/dL  • LDL cholesterol  is called bad cholesterol  because it forms plaque in your arteries  As plaque builds up, your arteries become narrow, and less blood flows through  When plaque decreases blood flow to your heart, you may have chest pain  If plaque completely blocks an artery that brings blood to your heart, you may have a heart attack  Plaque can break off and form blood clots  Blood clots may block arteries in your brain and cause a stroke  The level should be less than 130 mg/dL and is best at about 100 mg/dL  • HDL cholesterol  is called good cholesterol  because it helps remove LDL cholesterol from your arteries  It does this by attaching to LDL cholesterol and carrying it to your liver  Your liver breaks down LDL cholesterol so your body can get rid of it  High levels of HDL cholesterol can help prevent a heart attack and stroke   Low levels of HDL cholesterol can increase your risk for heart disease, heart attack, and stroke  The level should be 60 mg/dL or higher  • Triglycerides  are a type of fat that store energy from foods you eat  High levels of triglycerides also cause plaque buildup  This can increase your risk for a heart attack or stroke  If your triglyceride level is high, your LDL cholesterol level may also be high  The level should be less than 150 mg/dL  Any of the following can increase your risk for high cholesterol:   • Smoking cigarettes    • Being overweight or obese, or not getting enough exercise    • Drinking large amounts of alcohol    • A medical condition such as hypertension (high blood pressure) or diabetes    • Certain genes passed from your parents to you    • Age older than 65 years    What you need to know about having your cholesterol levels checked: Adults 21to 39years of age should have their cholesterol levels checked every 4 to 6 years  Adults 45 years or older should have their cholesterol checked every 1 to 2 years  You may need your cholesterol checked more often, or at a younger age, if you have risk factors for heart disease  You may also need to have your cholesterol checked more often if you have other health conditions, such as diabetes  Blood tests are used to check cholesterol levels  Blood tests measure your levels of triglycerides, LDL cholesterol, and HDL cholesterol  How healthy fats affect your cholesterol levels:  Healthy fats, also called unsaturated fats, help lower LDL cholesterol and triglyceride levels  Healthy fats include the following:  • Monounsaturated fats  are found in foods such as olive oil, canola oil, avocado, nuts, and olives  • Polyunsaturated fats,  such as omega 3 fats, are found in fish, such as salmon, trout, and tuna  They can also be found in plant foods such as flaxseed, walnuts, and soybeans      How unhealthy fats affect your cholesterol levels:  Unhealthy fats increase LDL cholesterol and triglyceride levels  They are found in foods high in cholesterol, saturated fat, and trans fat:  • Cholesterol  is found in eggs, dairy, and meat  • Saturated fat  is found in butter, cheese, ice cream, whole milk, and coconut oil  Saturated fat is also found in meat, such as sausage, hot dogs, and bologna  • Trans fat  is found in liquid oils and is used in fried and baked foods  Foods that contain trans fats include chips, crackers, muffins, sweet rolls, microwave popcorn, and cookies  Treatment  for high cholesterol will also decrease your risk of heart disease, heart attack, and stroke  Treatment may include any of the following:  • Lifestyle changes  may include food, exercise, weight loss, and quitting smoking  You may also need to decrease the amount of alcohol you drink  Your healthcare provider will want you to start with lifestyle changes  Other treatment may be added if lifestyle changes are not enough  Your healthcare provider may recommend you work with a team to manage hyperlipidemia  The team may include medical experts such as a dietitian, an exercise or physical therapist, and a behavior therapist  Your family members may be included in helping you create lifestyle changes  • Medicines  may be given to lower your LDL cholesterol, triglyceride levels, or total cholesterol level  You may need medicines to lower your cholesterol if any of the following is true:    ? You have a history of stroke, TIA, unstable angina, or a heart attack  ? Your LDL cholesterol level is 190 mg/dL or higher  ? You are age 36 to 76 years, have diabetes or heart disease risk factors, and your LDL cholesterol is 70 mg/dL or higher  • Supplements  include fish oil, red yeast rice, and garlic  Fish oil may help lower your triglyceride and LDL cholesterol levels  It may also increase your HDL cholesterol level  Red yeast rice may help decrease your total cholesterol level and LDL cholesterol level   Garlic may help lower your total cholesterol level  Do not take any supplements without talking to your healthcare provider  Food changes you can make to lower your cholesterol levels:  A dietitian can help you create a healthy eating plan  He or she can show you how to read food labels and choose foods low in saturated fat, trans fats, and cholesterol  • Decrease the total amount of fat you eat  Choose lean meats, fat-free or 1% fat milk, and low-fat dairy products, such as yogurt and cheese  Try to limit or avoid red meats  Limit or do not eat fried foods or baked goods, such as cookies  • Replace unhealthy fats with healthy fats  Cook foods in olive oil or canola oil  Choose soft margarines that are low in saturated fat and trans fat  Seeds, nuts, and avocados are other examples of healthy fats  • Eat foods with omega-3 fats  Examples include salmon, tuna, mackerel, walnuts, and flaxseed  Eat fish 2 times per week  Pregnant women should not eat fish that have high levels of mercury, such as shark, swordfish, and maryse mackerel  • Increase the amount of high-fiber foods you eat  High-fiber foods can help lower your LDL cholesterol  Aim to get between 20 and 30 grams of fiber each day  Fruits and vegetables are high in fiber  Eat at least 5 servings each day  Other high-fiber foods are whole-grain or whole-wheat breads, pastas, or cereals, and brown rice  Eat 3 ounces of whole-grain foods each day  Increase fiber slowly  You may have abdominal discomfort, bloating, and gas if you add fiber to your diet too quickly  • Eat healthy protein foods  Examples include low-fat dairy products, skinless chicken and turkey, fish, and nuts  • Limit foods and drinks that are high in sugar  Your dietitian or healthcare provider can help you create daily limits for high-sugar foods and drinks  The limit may be lower if you have diabetes or another health condition   Limits can also help you lose weight if needed  Lifestyle changes you can make to lower your cholesterol levels:   • Maintain a healthy weight  Ask your healthcare provider what a healthy weight is for you  Ask him or her to help you create a weight loss plan if needed  Weight loss can decrease your total cholesterol and triglyceride levels  Weight loss may also help keep your blood pressure at a healthy level  • Be physically active throughout the day  Physical activity, such as exercise, can help lower your total cholesterol level and maintain a healthy weight  Physical activity can also help increase your HDL cholesterol level  Work with your healthcare provider to create an program that is right for you  Get at least 30 to 40 minutes of moderate physical activity most days of the week  Examples of exercise include brisk walking, swimming, or biking  Also include strength training at least 2 times each week  Your healthcare providers can help you create a physical activity plan  • Do not smoke  Nicotine and other chemicals in cigarettes and cigars can raise your cholesterol levels  Ask your healthcare provider for information if you currently smoke and need help to quit  E-cigarettes or smokeless tobacco still contain nicotine  Talk to your healthcare provider before you use these products  • Limit or do not drink alcohol  Alcohol can increase your triglyceride levels  Ask your healthcare provider before you drink alcohol  Ask how much is okay for you to drink in 24 hours or 1 week  Follow up with your doctor as directed:  Write down your questions so you remember to ask them during your visits  © Copyright [employer]hu [employer]iffer 2022 Information is for End User's use only and may not be sold, redistributed or otherwise used for commercial purposes  The above information is an  only  It is not intended as medical advice for individual conditions or treatments   Talk to your doctor, nurse or pharmacist before following any medical regimen to see if it is safe and effective for you

## 2023-03-24 ENCOUNTER — HOSPITAL ENCOUNTER (OUTPATIENT)
Dept: ULTRASOUND IMAGING | Facility: HOSPITAL | Age: 68
End: 2023-03-24

## 2023-03-24 DIAGNOSIS — R10.84 ABDOMINAL PAIN, GENERALIZED: ICD-10-CM

## 2023-04-04 DIAGNOSIS — K76.89 HEPATIC CYST: ICD-10-CM

## 2023-04-04 DIAGNOSIS — K76.0 FATTY LIVER: Primary | ICD-10-CM

## 2023-04-07 ENCOUNTER — TELEPHONE (OUTPATIENT)
Dept: INTERNAL MEDICINE CLINIC | Facility: CLINIC | Age: 68
End: 2023-04-07

## 2023-04-07 LAB
ALBUMIN SERPL-MCNC: 4.3 G/DL (ref 3.6–5.1)
ALBUMIN/GLOB SERPL: 1.5 (CALC) (ref 1–2.5)
ALP SERPL-CCNC: 102 U/L (ref 35–144)
ALT SERPL-CCNC: 15 U/L (ref 9–46)
AST SERPL-CCNC: 19 U/L (ref 10–35)
BASOPHILS # BLD AUTO: 66 CELLS/UL (ref 0–200)
BASOPHILS NFR BLD AUTO: 0.8 %
BILIRUB SERPL-MCNC: 0.7 MG/DL (ref 0.2–1.2)
BUN SERPL-MCNC: 15 MG/DL (ref 7–25)
BUN/CREAT SERPL: NORMAL (CALC) (ref 6–22)
CALCIUM SERPL-MCNC: 9 MG/DL (ref 8.6–10.3)
CHLORIDE SERPL-SCNC: 102 MMOL/L (ref 98–110)
CHOLEST SERPL-MCNC: 137 MG/DL
CHOLEST/HDLC SERPL: 3.8 (CALC)
CO2 SERPL-SCNC: 26 MMOL/L (ref 20–32)
CREAT SERPL-MCNC: 0.77 MG/DL (ref 0.7–1.35)
EOSINOPHIL # BLD AUTO: 398 CELLS/UL (ref 15–500)
EOSINOPHIL NFR BLD AUTO: 4.8 %
ERYTHROCYTE [DISTWIDTH] IN BLOOD BY AUTOMATED COUNT: 12.3 % (ref 11–15)
GFR/BSA.PRED SERPLBLD CYS-BASED-ARV: 98 ML/MIN/1.73M2
GLOBULIN SER CALC-MCNC: 2.8 G/DL (CALC) (ref 1.9–3.7)
GLUCOSE SERPL-MCNC: 85 MG/DL (ref 65–99)
HCT VFR BLD AUTO: 44.4 % (ref 38.5–50)
HDLC SERPL-MCNC: 36 MG/DL
HGB BLD-MCNC: 15.2 G/DL (ref 13.2–17.1)
LDLC SERPL CALC-MCNC: 82 MG/DL (CALC)
LYMPHOCYTES # BLD AUTO: 2540 CELLS/UL (ref 850–3900)
LYMPHOCYTES NFR BLD AUTO: 30.6 %
MCH RBC QN AUTO: 30.6 PG (ref 27–33)
MCHC RBC AUTO-ENTMCNC: 34.2 G/DL (ref 32–36)
MCV RBC AUTO: 89.5 FL (ref 80–100)
MONOCYTES # BLD AUTO: 739 CELLS/UL (ref 200–950)
MONOCYTES NFR BLD AUTO: 8.9 %
NEUTROPHILS # BLD AUTO: 4557 CELLS/UL (ref 1500–7800)
NEUTROPHILS NFR BLD AUTO: 54.9 %
NONHDLC SERPL-MCNC: 101 MG/DL (CALC)
PLATELET # BLD AUTO: 239 THOUSAND/UL (ref 140–400)
PMV BLD REES-ECKER: 10.9 FL (ref 7.5–12.5)
POTASSIUM SERPL-SCNC: 4 MMOL/L (ref 3.5–5.3)
PROT SERPL-MCNC: 7.1 G/DL (ref 6.1–8.1)
PSA SERPL-MCNC: 0.51 NG/ML
RBC # BLD AUTO: 4.96 MILLION/UL (ref 4.2–5.8)
SODIUM SERPL-SCNC: 137 MMOL/L (ref 135–146)
TRIGL SERPL-MCNC: 97 MG/DL
TSH SERPL-ACNC: 0.73 MIU/L (ref 0.4–4.5)
WBC # BLD AUTO: 8.3 THOUSAND/UL (ref 3.8–10.8)

## 2023-04-07 NOTE — TELEPHONE ENCOUNTER
----- Message from Karen Muñoz MD sent at 4/4/2023  7:39 PM EDT -----  Please  let him know he  has fatty liver and  possibly  hepatic  cyst   I  would  like  him  to  see the  GI  I  am placing  the  referral to  see the GI his  renal  cyst  is  stable  ----- Message -----  From: Interface, Radiology Results In  Sent: 4/4/2023   3:46 PM EDT  To: Karen Muñoz MD

## 2023-05-13 PROBLEM — Z00.00 ROUTINE MEDICAL EXAM: Status: RESOLVED | Noted: 2023-03-14 | Resolved: 2023-05-13

## 2023-06-12 DIAGNOSIS — I10 ESSENTIAL HYPERTENSION: ICD-10-CM

## 2023-06-12 RX ORDER — LOSARTAN POTASSIUM 100 MG/1
TABLET ORAL
Qty: 90 TABLET | Refills: 3 | Status: SHIPPED | OUTPATIENT
Start: 2023-06-12

## 2024-02-10 ENCOUNTER — HOSPITAL ENCOUNTER (EMERGENCY)
Facility: HOSPITAL | Age: 69
Discharge: HOME | End: 2024-02-10
Attending: EMERGENCY MEDICINE | Admitting: EMERGENCY MEDICINE
Payer: COMMERCIAL

## 2024-02-10 ENCOUNTER — APPOINTMENT (EMERGENCY)
Dept: RADIOLOGY | Facility: HOSPITAL | Age: 69
End: 2024-02-10
Payer: COMMERCIAL

## 2024-02-10 VITALS
HEART RATE: 67 BPM | SYSTOLIC BLOOD PRESSURE: 146 MMHG | DIASTOLIC BLOOD PRESSURE: 87 MMHG | RESPIRATION RATE: 18 BRPM | OXYGEN SATURATION: 97 % | TEMPERATURE: 97.6 F

## 2024-02-10 DIAGNOSIS — S82.891A CLOSED FRACTURE OF RIGHT ANKLE, INITIAL ENCOUNTER: Primary | ICD-10-CM

## 2024-02-10 PROCEDURE — 96374 THER/PROPH/DIAG INJ IV PUSH: CPT

## 2024-02-10 PROCEDURE — 99283 EMERGENCY DEPT VISIT LOW MDM: CPT

## 2024-02-10 PROCEDURE — 29515 APPLICATION SHORT LEG SPLINT: CPT | Performed by: PHYSICIAN ASSISTANT

## 2024-02-10 PROCEDURE — 73590 X-RAY EXAM OF LOWER LEG: CPT

## 2024-02-10 PROCEDURE — 73610 X-RAY EXAM OF ANKLE: CPT

## 2024-02-10 PROCEDURE — 99284 EMERGENCY DEPT VISIT MOD MDM: CPT | Performed by: PHYSICIAN ASSISTANT

## 2024-02-10 RX ORDER — IBUPROFEN 600 MG/1
600 TABLET ORAL EVERY 6 HOURS PRN
Qty: 20 TABLET | Refills: 0 | Status: SHIPPED | OUTPATIENT
Start: 2024-02-10

## 2024-02-10 RX ORDER — OXYCODONE HYDROCHLORIDE AND ACETAMINOPHEN 5; 325 MG/1; MG/1
1 TABLET ORAL EVERY 6 HOURS PRN
Qty: 12 TABLET | Refills: 0 | Status: SHIPPED | OUTPATIENT
Start: 2024-02-10 | End: 2024-02-13

## 2024-02-10 RX ORDER — HYDROMORPHONE HCL/PF 1 MG/ML
0.5 SYRINGE (ML) INJECTION ONCE
Status: COMPLETED | OUTPATIENT
Start: 2024-02-10 | End: 2024-02-10

## 2024-02-10 RX ADMIN — HYDROMORPHONE HYDROCHLORIDE 0.5 MG: 1 INJECTION, SOLUTION INTRAMUSCULAR; INTRAVENOUS; SUBCUTANEOUS at 11:46

## 2024-02-10 NOTE — ED NOTES
Discharge instructions reviewed with pt. Pt verbalized understanding. And has no further questions at this time. Pt ambulatory off unit with steady gait.      Josie Blank RN  02/10/24 1156

## 2024-02-10 NOTE — ED PROVIDER NOTES
History  Chief Complaint   Patient presents with    Ankle Injury     Pt reports tripping down 3 stairs, landed on R ankle, c/o 10/10 pain. Swelling noted to R ankle, pt reports decreased sensation in that foot     Past Medical History: Hyperlipidemia, HTN, Seizure disorder, Sleep apnea, Tremors of nervous system,   No PHS    Pt presents to ED c/o right ankle injury, pain, swelling, deformity when he tripped on a can while walking down the stairs, carrying laundry and fell, injury ankle.  Pt denies hitting head, no LOC, no other injuries or complaints        Prior to Admission Medications   Prescriptions Last Dose Informant Patient Reported? Taking?   cholecalciferol (VITAMIN D3) 1,000 units tablet   No No   Sig: Take 1 tablet (1,000 Units total) by mouth daily   cholecalciferol (VITAMIN D3) 25 mcg (1,000 units) tablet   No No   Sig: Take 1 tablet (1,000 Units total) by mouth daily   fluticasone (FLONASE) 50 mcg/act nasal spray   No No   Si sprays into each nostril daily   ibuprofen (MOTRIN) 400 mg tablet   No No   Sig: Take 1 tablet (400 mg total) by mouth every 6 (six) hours as needed for mild pain   Patient not taking: Reported on 3/14/2023   losartan (COZAAR) 100 MG tablet   No No   Sig: TAKE 1 TABLET BY MOUTH EVERY DAY   montelukast (SINGULAIR) 10 mg tablet   No No   Sig: Take 1 tablet (10 mg total) by mouth daily at bedtime      Facility-Administered Medications: None       Past Medical History:   Diagnosis Date    Hyperlipidemia     Hypertension     Seizure disorder (HCC)     Sleep apnea     Tremors of nervous system     per [facility]    Vitamin D deficiency disease        Past Surgical History:   Procedure Laterality Date    NO PAST SURGERIES         Family History   Problem Relation Age of Onset    Colon cancer Brother     Stroke Brother     Heart disease Brother         pacemaker in situ     I have reviewed and agree with the history as documented.    E-Cigarette/Vaping    E-Cigarette Use Never User       E-Cigarette/Vaping Substances    Nicotine No     THC No     CBD No     Flavoring No     Other No     Unknown No      Social History     Tobacco Use    Smoking status: Never    Smokeless tobacco: Never   Vaping Use    Vaping status: Never Used   Substance Use Topics    Alcohol use: Not Currently     Comment: No use     Drug use: Never     Comment: No use       Review of Systems   Constitutional:  Negative for fever.   HENT:  Negative for sore throat.    Respiratory:  Negative for shortness of breath.    Cardiovascular:  Negative for chest pain.   Gastrointestinal:  Negative for abdominal pain and vomiting.   Genitourinary:  Negative for dysuria and frequency.   Musculoskeletal:  Positive for arthralgias, gait problem, joint swelling and myalgias.   Skin:  Negative for wound.   Neurological:  Positive for numbness. Negative for dizziness, weakness and headaches.   Psychiatric/Behavioral:  Negative for behavioral problems.    All other systems reviewed and are negative.      Physical Exam  Physical Exam  Vitals and nursing note reviewed.   Constitutional:       General: He is in acute distress (moderate).      Appearance: He is well-developed.   HENT:      Head: Normocephalic and atraumatic.      Right Ear: External ear normal.      Left Ear: External ear normal.      Nose: Nose normal.      Mouth/Throat:      Mouth: Mucous membranes are moist.      Pharynx: Oropharynx is clear.   Eyes:      Conjunctiva/sclera: Conjunctivae normal.   Cardiovascular:      Rate and Rhythm: Normal rate and regular rhythm.   Pulmonary:      Effort: Pulmonary effort is normal. No respiratory distress.   Musculoskeletal:         General: Swelling, tenderness, deformity and signs of injury present. Normal range of motion.      Cervical back: Normal range of motion and neck supple. No tenderness.      Comments: R ankle injury, distal NV intact, skin intact, good cap refill   Skin:     General: Skin is warm.      Coloration: Skin is pale.  "     Comments: diaphoretic   Neurological:      General: No focal deficit present.      Mental Status: He is alert and oriented to person, place, and time.   Psychiatric:         Behavior: Behavior normal.         Vital Signs  ED Triage Vitals   Temperature Pulse Respirations Blood Pressure SpO2   02/10/24 1134 02/10/24 1132 02/10/24 1132 02/10/24 1133 02/10/24 1132   97.6 °F (36.4 °C) 81 16 163/90 99 %      Temp Source Heart Rate Source Patient Position - Orthostatic VS BP Location FiO2 (%)   02/10/24 1134 -- 02/10/24 1300 02/10/24 1300 --   Oral  Lying Right arm       Pain Score       02/10/24 1146       10 - Worst Possible Pain           Vitals:    02/10/24 1132 02/10/24 1133 02/10/24 1200 02/10/24 1300   BP:  163/90 110/68 146/87   Pulse: 81  61 67   Patient Position - Orthostatic VS:    Lying         Visual Acuity      ED Medications  Medications   HYDROmorphone (DILAUDID) injection 0.5 mg (0.5 mg Intravenous Given 2/10/24 1146)       Diagnostic Studies  Results Reviewed       None                   XR ankle 3+ views LEFT   ED Interpretation by Miroslava Bautista PA-C (02/10 1226)   Bimalleolar, poss trimall fx, mortise disruption      XR tibia fibula 2 views LEFT   ED Interpretation by Miroslava Bautista PA-C (02/10 1226)   + ankel fx, proximal tib/fib no fx                 Procedures  Splint application    Date/Time: 2/10/2024 2:23 PM    Performed by: Miroslava Bautista PA-C  Authorized by: Miroslava Bautista PA-C  [place] Protocol:  Procedure performed by: (ANA BALDWIN)  Consent: Verbal consent obtained.  Risks and benefits: risks, benefits and alternatives were discussed  Consent given by: patient  Time out: Immediately prior to procedure a \"time out\" was called to verify the correct patient, procedure, equipment, support staff and site/side marked as required.  Patient understanding: patient states understanding of the procedure being performed  Patient identity confirmed: verbally with patient    Pre-procedure " details:     Sensation:  Normal    Skin color:  Normal  Procedure details:     Laterality:  Right    Location:  Ankle    Ankle:  R ankle    Splint type:  Short leg and sugar tong    Supplies:  Cotton padding, Ortho-Glass and elastic bandage  Post-procedure details:     Pain:  Improved    Sensation:  Normal    Skin color:  Normal, good cap refill, moves toes, distal sensation intact    Patient tolerance of procedure:  Tolerated well, no immediate complications           ED Course  ED Course as of 02/10/24 1425   Sat Feb 10, 2024   1226 TT Ortho On Duty: Dr GREGG Alexander, place in an AO splint (posterior slab with a sugar tong from the heel to both sides of the leg).  Have patient follow up with Dr Connolly this week.                                             Medical Decision Making  Pt with fall, r ankle injury, ddx: fx, dislocation, strain, sprain, contusion, tendon injury.  Pt with acute pain, decision made to splint in neutral position for comfort prior to xray; posterior splint placed  Xray shows + fx, consulted ortho, agrees with plan, splint, dc, fu outpt.  Pt splinted, crutches given, able to use well, feels comfortable with dc at this time.    Amount and/or Complexity of Data Reviewed  Radiology: ordered and independent interpretation performed. Decision-making details documented in ED Course.    Risk  OTC drugs.  Prescription drug management.             Disposition  Final diagnoses:   Closed fracture of right ankle, initial encounter     Time reflects when diagnosis was documented in both MDM as applicable and the Disposition within this note       Time User Action Codes Description Comment    2/10/2024 12:30 PM Miroslava Bautista Add [S82.891A] Closed fracture of right ankle, initial encounter           ED Disposition       ED Disposition   Discharge    Condition   Stable    Date/Time   Sat Feb 10, 2024  1:32 PM    Comment   Dawit Segura discharge to home/self care.                   Follow-up Information        Follow up With Specialties Details Why Contact Info    Mason Connolly DO Orthopedic Surgery  Call Monday for appointment [address]  [address]  [place] [address] [phone]              Discharge Medication List as of 2/10/2024  1:38 PM        START taking these medications    Details   oxyCODONE-acetaminophen (PERCOCET) 5-325 mg per tablet Take 1 tablet by mouth every 6 (six) hours as needed for moderate pain for up to 3 days Max Daily Amount: 4 tablets, Starting Sat 2/10/2024, Until Tue 2/13/2024 at 2359, Normal           CONTINUE these medications which have CHANGED    Details   ibuprofen (MOTRIN) 600 mg tablet Take 1 tablet (600 mg total) by mouth every 6 (six) hours as needed for mild pain, Starting Sat 2/10/2024, Normal           CONTINUE these medications which have NOT CHANGED    Details   cholecalciferol (VITAMIN D3) 1,000 units tablet Take 1 tablet (1,000 Units total) by mouth daily, Starting Tue 3/14/2023, Until Mon 6/12/2023, Normal      cholecalciferol (VITAMIN D3) 25 mcg (1,000 units) tablet Take 1 tablet (1,000 Units total) by mouth daily, Starting Tue 3/14/2023, Until Mon 6/12/2023, Normal      fluticasone (FLONASE) 50 mcg/act nasal spray 2 sprays into each nostril daily, Starting Tue 3/14/2023, Until Mon 6/12/2023, Normal      losartan (COZAAR) 100 MG tablet TAKE 1 TABLET BY MOUTH EVERY DAY, Normal      montelukast (SINGULAIR) 10 mg tablet Take 1 tablet (10 mg total) by mouth daily at bedtime, Starting Tue 3/14/2023, Until Mon 6/12/2023, Normal             No discharge procedures on file.    PDMP Review       None            ED Provider  Electronically Signed by             Miroslava Bautista PA-C  02/10/24 8162

## 2024-02-10 NOTE — Clinical Note
Dawit Segura was seen and treated in our emergency department on 2/10/2024.        No work until cleared by Family Doctor/Orthopedics        Diagnosis:     Dawit  .    He may return on this date:          If you have any questions or concerns, please don't hesitate to call.      Josie Blank, JOCELYN    ______________________________           _______________          _______________  Hospital Representative                              Date                                Time

## 2024-02-10 NOTE — DISCHARGE INSTRUCTIONS
Keep on Splint and use crutches, non-weight bearing until follow-up with orthopedic doctor.  Use Tylenol 650 mg every 4 hours or Anti-inflammatories like Advil, Motrin, Ibuprofen, Aleve every 6 hours; you can alternate the 2 medications taking something every 3 hours for pain, if no improvement add percocet    Follow-up with orthopedic doctor, call office on Monday

## 2024-02-12 ENCOUNTER — TELEPHONE (OUTPATIENT)
Age: 69
End: 2024-02-12

## 2024-02-12 NOTE — TELEPHONE ENCOUNTER
Caller: Savi stephen -spouse     Doctor: Lachman     Reason for call: Patient calling about rescheduled appt patient is in a lot of pain with a broken ankle and is asking for a sooner appt   Please advise     Call back#: [phone]

## 2024-02-13 NOTE — TELEPHONE ENCOUNTER
Caller: Savi - Patient Spouse    Doctor: Lachman    Reason for call: Patient spouse called looking for update on this.  I let her know the clinical team is working on things.  She would appreciate a call back to coordinate things when possible.    Call back#: [phone]    Alternate# (patient's number): [phone]

## 2024-02-15 ENCOUNTER — APPOINTMENT (OUTPATIENT)
Dept: LAB | Facility: CLINIC | Age: 69
End: 2024-02-15
Payer: COMMERCIAL

## 2024-02-15 ENCOUNTER — APPOINTMENT (OUTPATIENT)
Dept: LAB | Facility: AMBULARY SURGERY CENTER | Age: 69
End: 2024-02-15
Attending: STUDENT IN AN ORGANIZED HEALTH CARE EDUCATION/TRAINING PROGRAM
Payer: COMMERCIAL

## 2024-02-15 ENCOUNTER — OFFICE VISIT (OUTPATIENT)
Dept: OBGYN CLINIC | Facility: CLINIC | Age: 69
End: 2024-02-15
Payer: COMMERCIAL

## 2024-02-15 VITALS
WEIGHT: 196.2 LBS | BODY MASS INDEX: 29.06 KG/M2 | HEART RATE: 78 BPM | DIASTOLIC BLOOD PRESSURE: 88 MMHG | HEIGHT: 69 IN | SYSTOLIC BLOOD PRESSURE: 133 MMHG

## 2024-02-15 DIAGNOSIS — S82.851A CLOSED TRIMALLEOLAR FRACTURE OF RIGHT ANKLE, INITIAL ENCOUNTER: Primary | ICD-10-CM

## 2024-02-15 DIAGNOSIS — S82.851A CLOSED TRIMALLEOLAR FRACTURE OF RIGHT ANKLE, INITIAL ENCOUNTER: ICD-10-CM

## 2024-02-15 LAB
ANION GAP SERPL CALCULATED.3IONS-SCNC: 8 MMOL/L
APTT PPP: 29 SECONDS (ref 23–37)
BUN SERPL-MCNC: 20 MG/DL (ref 5–25)
CALCIUM SERPL-MCNC: 9.1 MG/DL (ref 8.4–10.2)
CHLORIDE SERPL-SCNC: 101 MMOL/L (ref 96–108)
CO2 SERPL-SCNC: 29 MMOL/L (ref 21–32)
CREAT SERPL-MCNC: 0.77 MG/DL (ref 0.6–1.3)
ERYTHROCYTE [DISTWIDTH] IN BLOOD BY AUTOMATED COUNT: 12.5 % (ref 11.6–15.1)
GFR SERPL CREATININE-BSD FRML MDRD: 93 ML/MIN/1.73SQ M
GLUCOSE P FAST SERPL-MCNC: 93 MG/DL (ref 65–99)
HCT VFR BLD AUTO: 47.6 % (ref 36.5–49.3)
HGB BLD-MCNC: 15.5 G/DL (ref 12–17)
INR PPP: 1 (ref 0.84–1.19)
MCH RBC QN AUTO: 31.1 PG (ref 26.8–34.3)
MCHC RBC AUTO-ENTMCNC: 32.6 G/DL (ref 31.4–37.4)
MCV RBC AUTO: 95 FL (ref 82–98)
PLATELET # BLD AUTO: 252 THOUSANDS/UL (ref 149–390)
PMV BLD AUTO: 11.1 FL (ref 8.9–12.7)
POTASSIUM SERPL-SCNC: 4.3 MMOL/L (ref 3.5–5.3)
PROTHROMBIN TIME: 13.1 SECONDS (ref 11.6–14.5)
RBC # BLD AUTO: 4.99 MILLION/UL (ref 3.88–5.62)
SODIUM SERPL-SCNC: 138 MMOL/L (ref 135–147)
WBC # BLD AUTO: 7.18 THOUSAND/UL (ref 4.31–10.16)

## 2024-02-15 PROCEDURE — 85730 THROMBOPLASTIN TIME PARTIAL: CPT

## 2024-02-15 PROCEDURE — 99204 OFFICE O/P NEW MOD 45 MIN: CPT | Performed by: STUDENT IN AN ORGANIZED HEALTH CARE EDUCATION/TRAINING PROGRAM

## 2024-02-15 PROCEDURE — 85027 COMPLETE CBC AUTOMATED: CPT

## 2024-02-15 PROCEDURE — 36415 COLL VENOUS BLD VENIPUNCTURE: CPT

## 2024-02-15 PROCEDURE — 80048 BASIC METABOLIC PNL TOTAL CA: CPT

## 2024-02-15 PROCEDURE — 85610 PROTHROMBIN TIME: CPT

## 2024-02-15 RX ORDER — ASPIRIN 81 MG/1
81 TABLET ORAL 2 TIMES DAILY
Qty: 90 TABLET | Refills: 0 | Status: SHIPPED | OUTPATIENT
Start: 2024-02-15 | End: 2024-02-20

## 2024-02-15 RX ORDER — CHLORHEXIDINE GLUCONATE ORAL RINSE 1.2 MG/ML
15 SOLUTION DENTAL ONCE
Status: CANCELLED | OUTPATIENT
Start: 2024-02-15 | End: 2024-02-15

## 2024-02-15 NOTE — PROGRESS NOTES
Orthopaedics Office Visit -new patient Visit    ASSESSMENT/PLAN:    Assessment:   Right closed trimalleolar fracture, DOI: 2/10/2024    Plan:   X-rays reviewed and discussed with patient revealing left trimalleolar ankle fracture with widening of the ankle mortise.  Patient is a Montgomery B fibula fracture with comminution, a small posterior malleoli are avulsion as well as a large displaced medial malleolus fracture fragment.  Pt to be nonweightbearing to right lower extremity in an A&O splint until time of surgery  Discussed with patient operative versus nonoperative intervention  Discussed the risks and benefits and alternatives of both treatment arms.  In order to avoid posttraumatic arthritis, decrease the incidence of malunions nonunions, as well as restore stability to the ankle the patient will be consented for surgical fixation of the right ankle  Risks, benefits, alternatives were discussed, risks including but not limited to deep vein thrombosis, pulmonary embolism, malunion, nonunion, damage to neurovascular structures both near and distant, infection both deep and superficial, symptomatic hardware, andriy implant fracture, hardware failure, metal sensitivity, chronic pain, postoperative stiffness, avascular necrosis, extremity weakness, decreased range of motion, need for subsequent repeat procedures, as well as the risks associated with general endotracheal anesthesia which include but not limited to death.  Patient agreed informed consent was obtained.  Order for preoperative chest x-ray, EKG  Order for preoperative blood test  Pt to continue at home analgesic regimen with Tylenol and ibuprofen for pain control.  Pt to start ASA 81mg BID for DVT ppx, script provided today  Pt to follow up in the post-operative period       _____________________________________________________  CHIEF COMPLAINT:  Chief Complaint   Patient presents with    Right Ankle - Fracture         SUBJECTIVE:  Dawit Segura is a 69 y.o.  male who presents 5 days status post right trimalleolar ankle fracture. He has been compliant with his nonweightbearing status.  Patient reports tripping down 3 stairs while carrying laundry down at his house and landing on his left ankle.  He states he felt immediate pain and swelling after the incident.  He states most of his pain is concentrated diffusely across the right ankle.  He states the pain is exacerbated with any attempts at movement and alleviated with rest.  He has been taking advil for pain control which provides him with adequate relief. He denies any previous trauma or surgery to the ankle. He denies any numbness or paresthesias in the right lower extremity.    PAST MEDICAL HISTORY:  Past Medical History:   Diagnosis Date    Hyperlipidemia     Hypertension     Seizure disorder (HCC)     Sleep apnea     Tremors of nervous system     per [facility]    Vitamin D deficiency disease        PAST SURGICAL HISTORY:  Past Surgical History:   Procedure Laterality Date    NO PAST SURGERIES         FAMILY HISTORY:  Family History   Problem Relation Age of Onset    Colon cancer Brother     Stroke Brother     Heart disease Brother         pacemaker in situ       SOCIAL HISTORY:  Social History     Tobacco Use    Smoking status: Never    Smokeless tobacco: Never   Vaping Use    Vaping status: Never Used   Substance Use Topics    Alcohol use: Not Currently     Comment: No use     Drug use: Never     Comment: No use       MEDICATIONS:    Current Outpatient Medications:     ibuprofen (MOTRIN) 600 mg tablet, Take 1 tablet (600 mg total) by mouth every 6 (six) hours as needed for mild pain, Disp: 20 tablet, Rfl: 0    losartan (COZAAR) 100 MG tablet, TAKE 1 TABLET BY MOUTH EVERY DAY, Disp: 90 tablet, Rfl: 3    cholecalciferol (VITAMIN D3) 1,000 units tablet, Take 1 tablet (1,000 Units total) by mouth daily, Disp: 90 tablet, Rfl: 0    cholecalciferol (VITAMIN D3) 25 mcg (1,000 units) tablet, Take 1 tablet (1,000 Units total)  "by mouth daily, Disp: 90 tablet, Rfl: 0    fluticasone (FLONASE) 50 mcg/act nasal spray, 2 sprays into each nostril daily, Disp: 48 mL, Rfl: 1    montelukast (SINGULAIR) 10 mg tablet, Take 1 tablet (10 mg total) by mouth daily at bedtime, Disp: 90 tablet, Rfl: 1    ALLERGIES:  No Known Allergies    REVIEW OF SYSTEMS:  MSK: Left ankle pain  Neuro: None  Pertinent items are otherwise noted in HPI.  A comprehensive review of systems was otherwise negative.    LABS:  HgA1c: No results found for: \"HGBA1C\"  BMP:   Lab Results   Component Value Date    CALCIUM 9.0 04/06/2023    K 4.0 04/06/2023    CO2 26 04/06/2023     04/06/2023    BUN 15 04/06/2023    CREATININE 0.77 04/06/2023     CBC: No components found for: \"CBC\"    _____________________________________________________  PHYSICAL EXAMINATION:  Vital signs: /88   Pulse 78   Ht 5' 9\" (1.753 m)   Wt 89 kg (196 lb 3.2 oz)   BMI 28.97 kg/m²   General: No acute distress, awake and alert  Psychiatric: Mood and affect appear appropriate  HEENT: Trachea Midline, No torticollis, no apparent facial trauma  Cardiovascular: No audible murmurs; Extremities appear perfused  Pulmonary: No audible wheezing or stridor  Skin: No open lesions; see further details (if any) below    MUSCULOSKELETAL EXAMINATION:  Extremities: Left lower extremity    The left lower extremity was exposed and inspected. Right leg A&O splint intact without evidence of strikethrough. All other visible skin intact without erythema, ecchymosis, effusion or obvious osseous deformity. TTP diffusely across the left ankle.  Range of motion not assessed due to known fracture.  Sensation intact to superficial peroneal, deep peroneal, sural, saphenous, plantar nerve distributions. Motor intact to extensor hallux longus, tibialis anterior, gastrocnemius muscles, extensor mechanism intact. Limb is well perfused. Brisk capillary refill in all 5 digits. Compartments soft and compressible. "       _____________________________________________________  STUDIES REVIEWED:  I personally reviewed the images and interpretation is as follows:  X-rays left ankle reveal:  left trimalleolar ankle fracture with widening of the ankle mortise.  Patient is a Montgomery B fibula fracture with comminution, a small posterior malleoli are avulsion as well as a large displaced medial malleolus fracture fragment.  PROCEDURES PERFORMED:  Procedures    Tim Garnica PA-C

## 2024-02-19 ENCOUNTER — ANESTHESIA EVENT (OUTPATIENT)
Dept: PERIOP | Facility: HOSPITAL | Age: 69
End: 2024-02-19
Payer: COMMERCIAL

## 2024-02-19 NOTE — PRE-PROCEDURE INSTRUCTIONS
Pre-Surgery Instructions:   Medication Instructions    aspirin (ASPIRIN LOW DOSE) 81 mg EC tablet PER MD    ibuprofen (MOTRIN) 600 mg tablet Stop taking 1 day prior to surgery.    losartan (COZAAR) 100 MG tablet Take night before surgery    montelukast (SINGULAIR) 10 mg tablet Take night before surgery   Medication instructions for day surgery reviewed. Please use only a sip of water to take your instructed medications. Avoid all over the counter vitamins, supplements and NSAIDS for one week prior to surgery per anesthesia guidelines. Tylenol is ok to take as needed.     You will receive a call one business day prior to surgery with an arrival time and hospital directions. If your surgery is scheduled on a Monday, the hospital will be calling you on the Friday prior to your surgery. If you have not heard from anyone by 8pm, please call the hospital supervisor through the hospital  at [phone] or [place] [phone]).    Do not eat or drink anything after midnight the night before your surgery, including candy, mints, lifesavers, or chewing gum. Do not drink alcohol 24hrs before your surgery. Try not to smoke at least 24hrs before your surgery.       Follow the pre surgery showering instructions as listed in the “My Surgical Experience Booklet” or otherwise provided by your surgeon's office. Do not use a blade to shave the surgical area 1 week before surgery. It is okay to use a clean electric clippers up to 24 hours before surgery. Do not apply any lotions, creams, including makeup, cologne, deodorant, or perfumes after showering on the day of your surgery. Do not use dry shampoo, hair spray, hair gel, or any type of hair products.     No contact lenses, eye make-up, or artificial eyelashes. Remove nail polish, including gel polish, and any artificial, gel, or acrylic nails if possible. Remove all jewelry including rings and body piercing jewelry.     Wear causal clothing that is easy to take on and  off. Consider your type of surgery.    Keep any valuables, jewelry, piercings at home. Please bring any specially ordered equipment (sling, braces) if indicated.    Arrange for a responsible person to drive you to and from the hospital on the day of your surgery. Visitor Guidelines discussed.     Call the surgeon's office with any new illnesses, exposures, or additional questions prior to surgery.    Please reference your “My Surgical Experience Booklet” for additional information to prepare for your upcoming surgery.

## 2024-02-20 ENCOUNTER — HOSPITAL ENCOUNTER (OUTPATIENT)
Facility: HOSPITAL | Age: 69
Setting detail: OUTPATIENT SURGERY
Discharge: HOME | End: 2024-02-20
Attending: STUDENT IN AN ORGANIZED HEALTH CARE EDUCATION/TRAINING PROGRAM | Admitting: STUDENT IN AN ORGANIZED HEALTH CARE EDUCATION/TRAINING PROGRAM
Payer: COMMERCIAL

## 2024-02-20 ENCOUNTER — APPOINTMENT (OUTPATIENT)
Dept: RADIOLOGY | Facility: HOSPITAL | Age: 69
End: 2024-02-20
Payer: COMMERCIAL

## 2024-02-20 ENCOUNTER — ANESTHESIA (OUTPATIENT)
Dept: PERIOP | Facility: HOSPITAL | Age: 69
End: 2024-02-20
Payer: COMMERCIAL

## 2024-02-20 VITALS
TEMPERATURE: 97.5 F | OXYGEN SATURATION: 95 % | HEIGHT: 69 IN | SYSTOLIC BLOOD PRESSURE: 145 MMHG | WEIGHT: 205 LBS | BODY MASS INDEX: 30.36 KG/M2 | RESPIRATION RATE: 20 BRPM | HEART RATE: 87 BPM | DIASTOLIC BLOOD PRESSURE: 80 MMHG

## 2024-02-20 DIAGNOSIS — S82.851A CLOSED TRIMALLEOLAR FRACTURE OF RIGHT ANKLE, INITIAL ENCOUNTER: Primary | ICD-10-CM

## 2024-02-20 LAB — GLUCOSE SERPL-MCNC: 93 MG/DL (ref 65–140)

## 2024-02-20 PROCEDURE — C1769 GUIDE WIRE: HCPCS | Performed by: STUDENT IN AN ORGANIZED HEALTH CARE EDUCATION/TRAINING PROGRAM

## 2024-02-20 PROCEDURE — 27829 TREAT LOWER LEG JOINT: CPT

## 2024-02-20 PROCEDURE — C9290 INJ, BUPIVACAINE LIPOSOME: HCPCS | Performed by: ANESTHESIOLOGY

## 2024-02-20 PROCEDURE — 27822 TREATMENT OF ANKLE FRACTURE: CPT

## 2024-02-20 PROCEDURE — 82948 REAGENT STRIP/BLOOD GLUCOSE: CPT

## 2024-02-20 PROCEDURE — C1713 ANCHOR/SCREW BN/BN,TIS/BN: HCPCS | Performed by: STUDENT IN AN ORGANIZED HEALTH CARE EDUCATION/TRAINING PROGRAM

## 2024-02-20 PROCEDURE — 27829 TREAT LOWER LEG JOINT: CPT | Performed by: STUDENT IN AN ORGANIZED HEALTH CARE EDUCATION/TRAINING PROGRAM

## 2024-02-20 PROCEDURE — 73600 X-RAY EXAM OF ANKLE: CPT

## 2024-02-20 PROCEDURE — 27822 TREATMENT OF ANKLE FRACTURE: CPT | Performed by: STUDENT IN AN ORGANIZED HEALTH CARE EDUCATION/TRAINING PROGRAM

## 2024-02-20 DEVICE — 2.7MM CORTEX SCREW SELF-TAPPING 24MM: Type: IMPLANTABLE DEVICE | Site: ANKLE | Status: FUNCTIONAL

## 2024-02-20 DEVICE — 3.5MM CANNULATED SCREW FULLY THREADED/48MM: Type: IMPLANTABLE DEVICE | Site: ANKLE | Status: FUNCTIONAL

## 2024-02-20 DEVICE — 1.25MM NON-THREADED GUIDE WIRE 150MM: Type: IMPLANTABLE DEVICE | Site: ANKLE | Status: FUNCTIONAL

## 2024-02-20 DEVICE — 3.5MM CORTEX SCREW SELF-TAPPING 16MM: Type: IMPLANTABLE DEVICE | Site: ANKLE | Status: FUNCTIONAL

## 2024-02-20 DEVICE — LCP ONE-THIRD TUBULAR PLATE WITH COLLAR 6 HOLES/69MM
Type: IMPLANTABLE DEVICE | Site: ANKLE | Status: FUNCTIONAL
Brand: LCP

## 2024-02-20 DEVICE — 3.5MM CANNULATED SCREW PARTIALLY THREADED/42MM: Type: IMPLANTABLE DEVICE | Site: ANKLE | Status: FUNCTIONAL

## 2024-02-20 DEVICE — 3.5MM LOCKING SCREW SLF-TPNG W/STARDRIVE(TM) RECESS 12MM: Type: IMPLANTABLE DEVICE | Site: ANKLE | Status: FUNCTIONAL

## 2024-02-20 DEVICE — 3.5MM LOCKING SCREW SLF-TPNG W/STARDRIVE(TM) RECESS 16MM: Type: IMPLANTABLE DEVICE | Site: ANKLE | Status: FUNCTIONAL

## 2024-02-20 DEVICE — 3.5MM CORTEX SCREW SELF-TAPPING 55MM: Type: IMPLANTABLE DEVICE | Site: ANKLE | Status: FUNCTIONAL

## 2024-02-20 RX ORDER — KETAMINE HCL IN NACL, ISO-OSM 100MG/10ML
SYRINGE (ML) INJECTION AS NEEDED
Status: DISCONTINUED | OUTPATIENT
Start: 2024-02-20 | End: 2024-02-20

## 2024-02-20 RX ORDER — ALBUTEROL SULFATE 2.5 MG/3ML
2.5 SOLUTION RESPIRATORY (INHALATION) ONCE AS NEEDED
Status: DISCONTINUED | OUTPATIENT
Start: 2024-02-20 | End: 2024-02-20 | Stop reason: HOSPADM

## 2024-02-20 RX ORDER — PROPOFOL 10 MG/ML
INJECTION, EMULSION INTRAVENOUS AS NEEDED
Status: DISCONTINUED | OUTPATIENT
Start: 2024-02-20 | End: 2024-02-20

## 2024-02-20 RX ORDER — SODIUM CHLORIDE, SODIUM LACTATE, POTASSIUM CHLORIDE, CALCIUM CHLORIDE 600; 310; 30; 20 MG/100ML; MG/100ML; MG/100ML; MG/100ML
INJECTION, SOLUTION INTRAVENOUS CONTINUOUS PRN
Status: DISCONTINUED | OUTPATIENT
Start: 2024-02-20 | End: 2024-02-20

## 2024-02-20 RX ORDER — ONDANSETRON 2 MG/ML
INJECTION INTRAMUSCULAR; INTRAVENOUS AS NEEDED
Status: DISCONTINUED | OUTPATIENT
Start: 2024-02-20 | End: 2024-02-20

## 2024-02-20 RX ORDER — CEFAZOLIN SODIUM 2 G/50ML
2000 SOLUTION INTRAVENOUS ONCE
Status: COMPLETED | OUTPATIENT
Start: 2024-02-20 | End: 2024-02-20

## 2024-02-20 RX ORDER — FENTANYL CITRATE 50 UG/ML
INJECTION, SOLUTION INTRAMUSCULAR; INTRAVENOUS AS NEEDED
Status: DISCONTINUED | OUTPATIENT
Start: 2024-02-20 | End: 2024-02-20

## 2024-02-20 RX ORDER — OXYCODONE HYDROCHLORIDE 5 MG/1
5 TABLET ORAL EVERY 4 HOURS PRN
Status: DISCONTINUED | OUTPATIENT
Start: 2024-02-20 | End: 2024-02-20 | Stop reason: HOSPADM

## 2024-02-20 RX ORDER — FENTANYL CITRATE/PF 50 MCG/ML
50 SYRINGE (ML) INJECTION
Status: DISCONTINUED | OUTPATIENT
Start: 2024-02-20 | End: 2024-02-20 | Stop reason: HOSPADM

## 2024-02-20 RX ORDER — ASPIRIN 325 MG
325 TABLET ORAL 2 TIMES DAILY
Qty: 84 TABLET | Refills: 0 | Status: SHIPPED | OUTPATIENT
Start: 2024-02-20 | End: 2024-04-02

## 2024-02-20 RX ORDER — ONDANSETRON 2 MG/ML
4 INJECTION INTRAMUSCULAR; INTRAVENOUS ONCE AS NEEDED
Status: DISCONTINUED | OUTPATIENT
Start: 2024-02-20 | End: 2024-02-20 | Stop reason: HOSPADM

## 2024-02-20 RX ORDER — CHLORHEXIDINE GLUCONATE ORAL RINSE 1.2 MG/ML
15 SOLUTION DENTAL ONCE
Status: COMPLETED | OUTPATIENT
Start: 2024-02-20 | End: 2024-02-20

## 2024-02-20 RX ORDER — PHENYLEPHRINE HCL IN 0.9% NACL 1 MG/10 ML
SYRINGE (ML) INTRAVENOUS AS NEEDED
Status: DISCONTINUED | OUTPATIENT
Start: 2024-02-20 | End: 2024-02-20

## 2024-02-20 RX ORDER — SUCCINYLCHOLINE/SOD CL,ISO/PF 100 MG/5ML
SYRINGE (ML) INTRAVENOUS AS NEEDED
Status: DISCONTINUED | OUTPATIENT
Start: 2024-02-20 | End: 2024-02-20

## 2024-02-20 RX ORDER — HYDROMORPHONE HCL/PF 1 MG/ML
0.5 SYRINGE (ML) INJECTION
Status: DISCONTINUED | OUTPATIENT
Start: 2024-02-20 | End: 2024-02-20 | Stop reason: HOSPADM

## 2024-02-20 RX ORDER — CEPHALEXIN 500 MG/1
500 CAPSULE ORAL EVERY 12 HOURS SCHEDULED
Qty: 10 CAPSULE | Refills: 0 | Status: SHIPPED | OUTPATIENT
Start: 2024-02-20 | End: 2024-02-25

## 2024-02-20 RX ORDER — DEXAMETHASONE SODIUM PHOSPHATE 10 MG/ML
INJECTION, SOLUTION INTRAMUSCULAR; INTRAVENOUS AS NEEDED
Status: DISCONTINUED | OUTPATIENT
Start: 2024-02-20 | End: 2024-02-20

## 2024-02-20 RX ORDER — BUPIVACAINE HYDROCHLORIDE 5 MG/ML
INJECTION, SOLUTION EPIDURAL; INTRACAUDAL
Status: COMPLETED | OUTPATIENT
Start: 2024-02-20 | End: 2024-02-20

## 2024-02-20 RX ORDER — OXYCODONE HYDROCHLORIDE 5 MG/1
5 TABLET ORAL EVERY 4 HOURS PRN
Qty: 30 TABLET | Refills: 0 | Status: SHIPPED | OUTPATIENT
Start: 2024-02-20 | End: 2024-03-01

## 2024-02-20 RX ORDER — MAGNESIUM HYDROXIDE 1200 MG/15ML
LIQUID ORAL AS NEEDED
Status: DISCONTINUED | OUTPATIENT
Start: 2024-02-20 | End: 2024-02-20 | Stop reason: HOSPADM

## 2024-02-20 RX ORDER — VANCOMYCIN HYDROCHLORIDE 1 G/20ML
INJECTION, POWDER, LYOPHILIZED, FOR SOLUTION INTRAVENOUS AS NEEDED
Status: DISCONTINUED | OUTPATIENT
Start: 2024-02-20 | End: 2024-02-20 | Stop reason: HOSPADM

## 2024-02-20 RX ORDER — LIDOCAINE HYDROCHLORIDE 20 MG/ML
INJECTION, SOLUTION EPIDURAL; INFILTRATION; INTRACAUDAL; PERINEURAL AS NEEDED
Status: DISCONTINUED | OUTPATIENT
Start: 2024-02-20 | End: 2024-02-20

## 2024-02-20 RX ORDER — MIDAZOLAM HYDROCHLORIDE 2 MG/2ML
INJECTION, SOLUTION INTRAMUSCULAR; INTRAVENOUS AS NEEDED
Status: DISCONTINUED | OUTPATIENT
Start: 2024-02-20 | End: 2024-02-20

## 2024-02-20 RX ADMIN — SODIUM CHLORIDE, SODIUM LACTATE, POTASSIUM CHLORIDE, AND CALCIUM CHLORIDE: .6; .31; .03; .02 INJECTION, SOLUTION INTRAVENOUS at 08:20

## 2024-02-20 RX ADMIN — BUPIVACAINE 20 ML: 13.3 INJECTION, SUSPENSION, LIPOSOMAL INFILTRATION at 08:20

## 2024-02-20 RX ADMIN — Medication 50 MCG: at 08:56

## 2024-02-20 RX ADMIN — CHLORHEXIDINE GLUCONATE 15 ML: 1.2 SOLUTION ORAL at 07:59

## 2024-02-20 RX ADMIN — BUPIVACAINE HYDROCHLORIDE 20 ML: 5 INJECTION, SOLUTION EPIDURAL; INTRACAUDAL at 08:20

## 2024-02-20 RX ADMIN — CEFAZOLIN SODIUM 2000 MG: 2 SOLUTION INTRAVENOUS at 08:26

## 2024-02-20 RX ADMIN — DEXAMETHASONE SODIUM PHOSPHATE 10 MG: 10 INJECTION INTRAMUSCULAR; INTRAVENOUS at 08:30

## 2024-02-20 RX ADMIN — Medication 100 MG: at 08:31

## 2024-02-20 RX ADMIN — SODIUM CHLORIDE, SODIUM LACTATE, POTASSIUM CHLORIDE, AND CALCIUM CHLORIDE: .6; .31; .03; .02 INJECTION, SOLUTION INTRAVENOUS at 09:53

## 2024-02-20 RX ADMIN — PROPOFOL 200 MG: 10 INJECTION, EMULSION INTRAVENOUS at 08:30

## 2024-02-20 RX ADMIN — FENTANYL CITRATE 25 MCG: 50 INJECTION INTRAMUSCULAR; INTRAVENOUS at 09:07

## 2024-02-20 RX ADMIN — FENTANYL CITRATE 25 MCG: 50 INJECTION INTRAMUSCULAR; INTRAVENOUS at 08:27

## 2024-02-20 RX ADMIN — Medication 30 MG: at 08:52

## 2024-02-20 RX ADMIN — ONDANSETRON 4 MG: 2 INJECTION INTRAMUSCULAR; INTRAVENOUS at 09:48

## 2024-02-20 RX ADMIN — MIDAZOLAM 2 MG: 1 INJECTION INTRAMUSCULAR; INTRAVENOUS at 08:07

## 2024-02-20 RX ADMIN — Medication 50 MCG: at 09:13

## 2024-02-20 RX ADMIN — LIDOCAINE HYDROCHLORIDE 100 MG: 20 INJECTION, SOLUTION EPIDURAL; INFILTRATION; INTRACAUDAL at 08:30

## 2024-02-20 RX ADMIN — FENTANYL CITRATE 25 MCG: 50 INJECTION INTRAMUSCULAR; INTRAVENOUS at 09:23

## 2024-02-20 RX ADMIN — FENTANYL CITRATE 25 MCG: 50 INJECTION INTRAMUSCULAR; INTRAVENOUS at 08:07

## 2024-02-20 NOTE — ANESTHESIA PROCEDURE NOTES
Peripheral Block    Patient location during procedure: holding area  Start time: 2/20/2024 8:15 AM  Reason for block: at surgeon's request and post-op pain management  Staffing  Performed by: Yunior Almanzar MD  Authorized by: Yunior Almanzar MD    Preanesthetic Checklist  Completed: patient identified, IV checked, site marked, risks and benefits discussed, surgical consent, monitors and equipment checked, pre-op evaluation and timeout performed  Peripheral Block  Prep: ChloraPrep  Patient monitoring: frequent blood pressure checks, continuous pulse oximetry and heart rate  Block type: Popliteal (saphenous)  Laterality: right  Injection technique: single-shot  Procedures: ultrasound guided, Ultrasound guidance required for the procedure to increase accuracy and safety of medication placement and decrease risk of complications.  Ultrasound permanent image savedbupivacaine liposomal (EXPAREL) 1.3 % injection 20 mL - Perineural   20 mL - 2/20/2024 8:20:00 AM  bupivacaine (PF) (MARCAINE) 0.5 % injection 20 mL - Perineural   20 mL - 2/20/2024 8:20:00 AM  Needle  Needle type: Stimuplex   Needle length: 4 in  Needle localization: anatomical landmarks and ultrasound guidance  Assessment  Injection assessment: incremental injection, frequent aspiration, injected with ease, negative aspiration, negative for heart rate change, no paresthesia on injection, no symptoms of intraneural/intravenous injection and needle tip visualized at all times  Paresthesia pain: none  Post-procedure:  site cleaned  patient tolerated the procedure well with no immediate complications

## 2024-02-20 NOTE — ANESTHESIA POSTPROCEDURE EVALUATION
Post-Op Assessment Note    CV Status:  Stable    Pain management: satisfactory to patient       Mental Status:  Sleepy and arousable   Hydration Status:  Euvolemic   PONV Controlled:  Controlled   Airway Patency:  Patent     Post Op Vitals Reviewed: Yes    No anethesia notable event occurred.    Staff: Anesthesiologist, CRNA               BP   136/79   Temp      Pulse  86   Resp   16   SpO2   99

## 2024-02-20 NOTE — INTERVAL H&P NOTE
H&P reviewed. After examining the patient I find no changes in the patients condition since the H&P had been written.    Vitals:    02/20/24 0755   BP: 159/98   Pulse: 87   Resp: 18   Temp: (!) 96.4 °F (35.8 °C)   SpO2: 99%

## 2024-02-20 NOTE — DISCHARGE INSTR - AVS FIRST PAGE
Discharge Instructions - Orthopedics  Dawit Segura 69 y.o. male MRN: [id number]  Unit/Bed#: AN OR MAIN    Weight Bearing Status:                                           Patient to be nonweightbearing to right lower extremity in splint for 2 weeks    DVT prophylaxis:  Patient to begin aspirin 325 mg twice per day for 6 weeks    Antibiotics:  Patient to begin Keflex 500 mg twice per day for 5 days    Pain:  Continue analgesics as directed    Dressing Instructions:   Please keep clean, dry and intact until follow up     Appt Instructions:   If you do not have your appointment, please call the clinic at [phone] to schedule with Dr. Connolly  Otherwise follow up as scheduled.    Contact the office sooner if you experience any increased numbness/tingling in the extremities.

## 2024-02-20 NOTE — OP NOTE
OPERATIVE REPORT  PATIENT NAME: Dawit Segura    :  1955  MRN: [id number]  Pt Location: [facility] 01    SURGERY DATE: 2024    Surgeon(s) and Role:     * Mason Connolly DO - Primary    * Tim Garnica PA-C - Assisting   I was present for the entire procedure., I was present for all critical portions of the procedure., A qualified resident physician was not available., and A physician assistant was required during the procedure for retraction, tissue handling, dissection and suturing.    Preop Diagnosis:  #1 right trimalleolar ankle fracture  2.  Right ankle fracture blisters    Post-Op Diagnosis:  #1 right trimalleolar ankle fracture  2.  Right disruption of the distal tibiofibular syndesmosis  3.  Right ankle fracture blisters    Procedures:  #1 open reduction internal fixation of right trimalleolar ankle fracture without fixation of the posterior lip  2.  Surgical fixation of right distal tibiofibular syndesmosis      Specimen(s):  None    Estimated Blood Loss:   10 cc    Drains:  None    Anesthesia Type:   General endotracheal    Operative Indications:  Patient is a 69-year-old male that sustained a ground-level fall resulting in a right trimalleolar ankle fracture.  The patient reportedly had no dislocation event.  He was seen at an outside facility and placed into a trilaminar splint with no dislocation and post splinting x-rays.  The patient was seen in the clinic and due to the multiple areas of ankle mortise disruption and subluxation the patient was consented for surgical fixation to restore stability and decrease complications associated with posttraumatic arthritis malunions and nonunions      Implants:   Synthes 6 hole one third tubular plate  Synthes 3.5 mm cannulated screws  3.5 mm Quadra cortical syndesmotic screw    Tourniquet time:   The tourniquet was inflated to 250 mmHg for 72 minutes      Complications:   No acute complications were encountered.  Patient was transferred to PACU in  stable condition    Operative findings:  Upon taking down the splint on the patient's right lower extremity the patient had fracture blisters medially and laterally.  These were over the area where the splint that was placed in outside facility was likely not allowing for appropriate swelling increases.  Pictures are documented in the chart.  These were outside the zone of the incisions both medially and laterally and were posterior to any planned incisions.  The patient had a large medial malleoli are fracture, a small posterior malleolus avulsion and a distal oblique fibular fracture with an anterior Javi fracture medial malleolus was addressed first anatomically reduced and compressed after inspecting the tibiotalar joint for any loose bodies which were none no talar subchondral injury noted.  Compression was obtained at the fracture site and secured attention was then turned to the lateral malleolus which was anatomically reduced held with a 2.7 mm lag screw and neutralized with a one third tubular plate.  After fixation the syndesmosis was stressed using both cotton testing and external rotation views.  The patient had millimeter to instability at the syndesmosis therefore a 3.5 millimeter screw was placed through the syndesmosis to surgically fixate the syndesmosis.  After fixation of the syndesmosis the ankle was stable and full dorsiflexion and plantarflexion were still intact.      Procedure and Technique:  Patient is a 69-year-old male that was seen and examined in the preoperative holding area.  The operative extremities marked.  All patient's questions were answered.  The patient was then taken back to the operating room where general endotracheal anesthesia was administered by department anesthesia.  The patient was then transferred over the operating room table using CT LS spine precautions.  All bony prominences were well-padded.  A well-padded nonsterile tourniquet was applied to the right upper  thigh.  The patient's splint was then taken down it was noted that he had fracture blisters on the posterior medial aspect of his ankle as well as the posterior lateral aspect of the ankle.  These were hemorrhagic in nature.  The patient's right lower extremity blisters were out of the zone of the planned surgical incisions.  The patient had minimal swelling over the anterior aspect of his ankle where incisions were planned.  The patient's right lower extremity was then prepped and draped in a standard sterile orthopedic fashion.  A timeout was performed to confirm correct side, correct patient, correct procedure.  All in agreement the procedure was started.  The right lower extremity was elevated and the tourniquet was inflated to 250 mmHg.  Sharp dissection was carried down through skin subcutaneous tissues medially anteriorly with a good skin bridge between the incision and the skin blisters.  Sharp dissection was carried down through skin subcutaneous tissues.  The saphenous vein and nerve were identified and protected anteriorly in the incision.  Dissection was carried down to the level of the medial malleolus fracture.  The entrapped periosteum and fracture hematoma was debrided using a pituitary rongeur and sharp dissection.  The medial malleolus fracture was distracted and the tibiotalar joint was inspected.  No loose osteochondral fragments or chondral delamination's were noted.  The ankle joint was copiously irrigated with sterile normal saline.  The medial malleolus was then reduced using a dental pick and a modified offset clamp.  Reduction was confirmed under AP and lateral radiographs.  Wires were then placed for cannulated screws.  A anteriorly based partially-threaded screw and a bicortical fully threaded screw posteriorly were placed and sequentially tightened to allow for universal compression across the fracture site.  After fixation of the medial malleolus attention was turned to the lateral  malleolus.  A laterally based incision was made sharply with a #10 blade through skin subcutaneous tissues.  Electrocautery was used to obtain hemostasis.  Dissection was carried down to the level of fibula fracture.  The entrapped periosteum and fracture hematoma was debrided using irrigation pituitary rongeur and a curette.  The patient had a small anterior Javi avulsion fragment.  The obliquity of the fracture was then clamped with 2 pointed reduction clamps and anatomically reduced.  It was then fixated using a 2.7 mm lag screw.  A one third tubular plate was then slid in the place and secured proximally with multiple 3.5 mm cortical screws and distally with two 3.5 mm locking screws to avoid hardware prominence and due to the patient's relatively osteopenic bone.  Afterwards the ankle mortise was restored.  Implants confirmed on AP and lateral x-rays.  The ankle was stressed both using a cotton test and a dorsiflexion external rotation stress test and there was 1 to 2 mm of instability at the syndesmosis.  Therefore a ankle a 3.5 mm syndesmotic screw was placed Quadra cortically.  Afterwards stability of the syndesmosis was once again checked and stable.  The ankle mortise was restored and syndesmotic relationships were restored.  The wounds were then copiously irrigated with sterile normal saline.  1 g of vancomycin was split between the 2 wounds.  The subcutaneous tissues were repaired using a 2-0 Monocryl suture and the skin was closed with vertical mattress 2-0 nylon suture.  The wounds were then dressed in Adaptic and then the blisters covered with Xeroform the patient's was then covered in 4 x 4's sterile cast padding and the patient was placed into an extremely well-padded trilaminar splint.  The patient's tourniquet was then let down and the patient was transferred off the operating room table using CT LS spine precautions extubated transferred to PACU in stable condition        Postoperative  plan:  Patient will be nonweightbearing to the right lower extremity.  Patient should continue with strict elevation of the right lower extremity at all times.  The patient will receive 5 days of Keflex 500 mg twice daily for DVT prophylaxis.  The patient will be started on aspirin 325 mg twice daily for 6 weeks for DVT prophylaxis.  Patient will need to follow-up in the office in 2 weeks for repeat x-ray evaluation of the right ankle wound check and removal of sutures    SIGNATURE: Mason Connolly DO  DATE: February 20, 2024  TIME: 10:32 AM

## 2024-02-21 NOTE — ANESTHESIA PREPROCEDURE EVALUATION
Procedure:  OPEN REDUCTION W/ INTERNAL FIXATION (ORIF) ANKLE (Right: Ankle)    Relevant Problems   CARDIO   (+) Essential hypertension             Anesthesia Plan  ASA Score- 2     Anesthesia Type- general with ASA Monitors.         Additional Monitors:     Airway Plan:     Comment: PNB.       Plan Factors-Exercise tolerance (METS): >4 METS.    Chart reviewed. EKG reviewed.   Patient summary reviewed.    Patient is not a current smoker. Patient not instructed to abstain from smoking on day of procedure. Patient did not smoke on day of surgery.            Induction- intravenous.    Postoperative Plan-     Informed Consent- Anesthetic plan and risks discussed with patient.  I personally reviewed this patient with the CRNA. Discussed and agreed on the Anesthesia Plan with the CRNA..

## 2024-03-04 ENCOUNTER — APPOINTMENT (OUTPATIENT)
Dept: RADIOLOGY | Facility: AMBULARY SURGERY CENTER | Age: 69
End: 2024-03-04
Attending: STUDENT IN AN ORGANIZED HEALTH CARE EDUCATION/TRAINING PROGRAM
Payer: COMMERCIAL

## 2024-03-04 ENCOUNTER — OFFICE VISIT (OUTPATIENT)
Dept: OBGYN CLINIC | Facility: CLINIC | Age: 69
End: 2024-03-04

## 2024-03-04 VITALS — WEIGHT: 205 LBS | BODY MASS INDEX: 30.36 KG/M2 | HEIGHT: 69 IN

## 2024-03-04 DIAGNOSIS — S82.851A CLOSED TRIMALLEOLAR FRACTURE OF RIGHT ANKLE, INITIAL ENCOUNTER: Primary | ICD-10-CM

## 2024-03-04 DIAGNOSIS — S82.851A CLOSED TRIMALLEOLAR FRACTURE OF RIGHT ANKLE, INITIAL ENCOUNTER: ICD-10-CM

## 2024-03-04 PROCEDURE — 99024 POSTOP FOLLOW-UP VISIT: CPT | Performed by: STUDENT IN AN ORGANIZED HEALTH CARE EDUCATION/TRAINING PROGRAM

## 2024-03-04 PROCEDURE — 73610 X-RAY EXAM OF ANKLE: CPT

## 2024-03-04 NOTE — LETTER
March 4, 2024     Patient: Dawit Segura  YOB: 1955  Date of Visit: 3/4/2024      To Whom it May Concern:    Dawit Segura is under my professional care. Dawit was seen in my office on 3/4/2024. Dawit may return to work on 6/4/24 .    If you have any questions or concerns, please don't hesitate to call.         Sincerely,          Mason Connolly,         CC: No Recipients

## 2024-03-04 NOTE — PROGRESS NOTES
Orthopaedics Office Visit -new patient Visit    ASSESSMENT/PLAN:    Assessment:   Right closed trimalleolar fracture, DOI: 2/10/2024  S/P ORIF right ankle 2/20/24    Plan:   X-rays reviewed and discussed with patient revealing maintained alignment of pt's previous trimalleolar ankle fracture with no signs of hardware failure or loosening.  Pt to be nonweightbearing to right lower extremity in a high tide cam boot  Work note given  Pt to begin PT for ROM exercises of the ankle in 2 weeks, script given today   Patient nonweightbearing for a total of 8 weeks.  Cleared for range of motion as tolerated to the right ankle  Instructed pt he may come out of cam boot to perform ankle ROM exercises demonstrated in office   Discussed with patient that he may begin to shower at this time  Instructed not to submerge or scrub incisions   Pt to continue ASA 81mg BID for DVT ppx for another 4 weeks  Pt to continue at home analgesic regimen with Tylenol and ibuprofen for pain control.  Pt to follow up in 6 weeks for repeat x-ray and re-evaluation      _____________________________________________________  CHIEF COMPLAINT:  Chief Complaint   Patient presents with    Right Ankle - Post-op         SUBJECTIVE:  Dawit Segura is a 69 y.o. male who presents 5 days status post right trimalleolar ankle fracture. He has been compliant with his nonweightbearing status.  Patient reports tripping down 3 stairs while carrying laundry down at his house and landing on his left ankle.  He states he felt immediate pain and swelling after the incident.  He states most of his pain is concentrated diffusely across the right ankle.  He states the pain is exacerbated with any attempts at movement and alleviated with rest.  He has been taking advil for pain control which provides him with adequate relief. He denies any previous trauma or surgery to the ankle. He denies any numbness or paresthesias in the right lower extremity.    Interval history  3/4/2024  Presents approximately 2 weeks status post ORIF right ankle from a closed right trimalleolar fracture.  He has been compliant with his nonweightbearing status and presents today in a wheelchair.  He endorses mild pain diffusely around the right ankle and swelling, especially over the lateral aspect of the ankle.  He states his pain today is a 3/10. He states he has not started PT yet.  He denies any numbness tingling or paresthesias in the right lower extremity.  Denies fevers or chills    PAST MEDICAL HISTORY:  Past Medical History:   Diagnosis Date    Hyperlipidemia     Hypertension     Sleep apnea     NO CPAP    Tremors of nervous system     per [place]    Vitamin D deficiency disease        PAST SURGICAL HISTORY:  Past Surgical History:   Procedure Laterality Date    COLONOSCOPY      MI OPEN TX TRIMALLEOLAR ANKLE FX W/O FIXJ PST LIP Right 2/20/2024    Procedure: OPEN REDUCTION W/ INTERNAL FIXATION (ORIF) ANKLE;  Surgeon: aMson Connolly DO;  Location: [facility];  Service: Orthopedics       FAMILY HISTORY:  Family History   Problem Relation Age of Onset    Colon cancer Brother     Stroke Brother     Heart disease Brother         pacemaker in situ       SOCIAL HISTORY:  Social History     Tobacco Use    Smoking status: Never    Smokeless tobacco: Never   Vaping Use    Vaping status: Never Used   Substance Use Topics    Alcohol use: Not Currently     Comment: No use     Drug use: Never     Comment: No use       MEDICATIONS:    Current Outpatient Medications:     aspirin 325 mg tablet, Take 1 tablet (325 mg total) by mouth 2 (two) times a day, Disp: 84 tablet, Rfl: 0    ibuprofen (MOTRIN) 600 mg tablet, Take 1 tablet (600 mg total) by mouth every 6 (six) hours as needed for mild pain, Disp: 20 tablet, Rfl: 0    losartan (COZAAR) 100 MG tablet, TAKE 1 TABLET BY MOUTH EVERY DAY (Patient taking differently: Take by mouth daily after lunch), Disp: 90 tablet, Rfl: 3    montelukast (SINGULAIR) 10 mg tablet, Take  "1 tablet (10 mg total) by mouth daily at bedtime, Disp: 90 tablet, Rfl: 1    ALLERGIES:  No Known Allergies    REVIEW OF SYSTEMS:  MSK: Left ankle pain  Neuro: None  Pertinent items are otherwise noted in HPI.  A comprehensive review of systems was otherwise negative.    LABS:  HgA1c: No results found for: \"HGBA1C\"  BMP:   Lab Results   Component Value Date    CALCIUM 9.1 02/15/2024    K 4.3 02/15/2024    CO2 29 02/15/2024     02/15/2024    BUN 20 02/15/2024    CREATININE 0.77 02/15/2024     CBC: No components found for: \"CBC\"    _____________________________________________________  PHYSICAL EXAMINATION:  Vital signs: Ht 5' 9\" (1.753 m)   Wt 93 kg (205 lb)   BMI 30.27 kg/m²   General: No acute distress, awake and alert  Psychiatric: Mood and affect appear appropriate  HEENT: Trachea Midline, No torticollis, no apparent facial trauma  Cardiovascular: No audible murmurs; Extremities appear perfused  Pulmonary: No audible wheezing or stridor  Skin: No open lesions; see further details (if any) below    MUSCULOSKELETAL EXAMINATION:  Extremities: Right lower extremity    The right lower extremity was exposed and inspected.  Surgical incisions clean dry and intact without erythema, drainage or signs of dehiscence.  Sutures removed and Steri-Strips placed.  All other visible skin intact without erythema, ecchymosis, effusion or obvious osseous deformity.  The patient's previous blisters over the posterior medial aspect of the ankle and the posterior lateral aspect have well-healed dermal layers and no weepage.  No signs of any necrosis.  TTP diffusely across the left ankle, especially over the lateral malleolus. Pt able to range ankle from 20 degrees of plantarflexion and 5 degrees of dorsiflexion. Sensation intact to superficial peroneal, deep peroneal, sural, saphenous, plantar nerve distributions. Motor intact to extensor hallux longus, tibialis anterior, gastrocnemius muscles, extensor mechanism intact. Limb " is well perfused. Brisk capillary refill in all 5 digits. Compartments soft and compressible.       _____________________________________________________  STUDIES REVIEWED:  I personally reviewed the images and interpretation is as follows:  X-rays right ankle reveal: Maintained alignment of patient's previous right trimalleolar ankle fracture.  No signs of hardware failure or loosening.  Ankle mortise well-maintained.  Syndesmotic relationships well-maintained.  PROCEDURES PERFORMED:  Procedures    Tim Garnica PA-C

## 2024-03-11 ENCOUNTER — TELEPHONE (OUTPATIENT)
Age: 69
End: 2024-03-11

## 2024-03-26 ENCOUNTER — HOSPITAL ENCOUNTER (EMERGENCY)
Facility: HOSPITAL | Age: 69
Discharge: HOME | End: 2024-03-27
Attending: EMERGENCY MEDICINE | Admitting: EMERGENCY MEDICINE
Payer: COMMERCIAL

## 2024-03-26 DIAGNOSIS — T78.00XA ANAPHYLAXIS DUE TO FOOD: Primary | ICD-10-CM

## 2024-03-26 PROCEDURE — 96361 HYDRATE IV INFUSION ADD-ON: CPT

## 2024-03-26 PROCEDURE — 94640 AIRWAY INHALATION TREATMENT: CPT

## 2024-03-26 PROCEDURE — 96375 TX/PRO/DX INJ NEW DRUG ADDON: CPT

## 2024-03-26 PROCEDURE — 96374 THER/PROPH/DIAG INJ IV PUSH: CPT

## 2024-03-26 PROCEDURE — 93005 ELECTROCARDIOGRAM TRACING: CPT

## 2024-03-26 PROCEDURE — 99291 CRITICAL CARE FIRST HOUR: CPT | Performed by: EMERGENCY MEDICINE

## 2024-03-26 PROCEDURE — 96372 THER/PROPH/DIAG INJ SC/IM: CPT

## 2024-03-26 PROCEDURE — 99283 EMERGENCY DEPT VISIT LOW MDM: CPT

## 2024-03-26 RX ORDER — EPINEPHRINE 0.3 MG/.3ML
0.3 INJECTION SUBCUTANEOUS ONCE AS NEEDED
Qty: 0.6 ML | Refills: 0 | Status: SHIPPED | OUTPATIENT
Start: 2024-03-26

## 2024-03-26 RX ORDER — DIPHENHYDRAMINE HYDROCHLORIDE 50 MG/ML
50 INJECTION INTRAMUSCULAR; INTRAVENOUS ONCE
Status: COMPLETED | OUTPATIENT
Start: 2024-03-26 | End: 2024-03-26

## 2024-03-26 RX ORDER — IPRATROPIUM BROMIDE AND ALBUTEROL SULFATE 2.5; .5 MG/3ML; MG/3ML
3 SOLUTION RESPIRATORY (INHALATION) ONCE
Status: COMPLETED | OUTPATIENT
Start: 2024-03-26 | End: 2024-03-26

## 2024-03-26 RX ORDER — EPINEPHRINE 1 MG/ML
INJECTION, SOLUTION, CONCENTRATE INTRAVENOUS
Status: COMPLETED
Start: 2024-03-26 | End: 2024-03-26

## 2024-03-26 RX ORDER — PREDNISONE 20 MG/1
60 TABLET ORAL DAILY
Qty: 12 TABLET | Refills: 0 | Status: SHIPPED | OUTPATIENT
Start: 2024-03-26 | End: 2024-03-31

## 2024-03-26 RX ORDER — FAMOTIDINE 10 MG/ML
20 INJECTION, SOLUTION INTRAVENOUS ONCE
Status: COMPLETED | OUTPATIENT
Start: 2024-03-26 | End: 2024-03-26

## 2024-03-26 RX ORDER — EPINEPHRINE 1 MG/ML
0.3 INJECTION, SOLUTION, CONCENTRATE INTRAVENOUS ONCE
Status: COMPLETED | OUTPATIENT
Start: 2024-03-26 | End: 2024-03-26

## 2024-03-26 RX ORDER — METHYLPREDNISOLONE SODIUM SUCCINATE 125 MG/2ML
125 INJECTION, POWDER, LYOPHILIZED, FOR SOLUTION INTRAMUSCULAR; INTRAVENOUS ONCE
Status: COMPLETED | OUTPATIENT
Start: 2024-03-26 | End: 2024-03-26

## 2024-03-26 RX ADMIN — Medication 0.3 MG: at 22:59

## 2024-03-26 RX ADMIN — DIPHENHYDRAMINE HYDROCHLORIDE 50 MG: 50 INJECTION, SOLUTION INTRAMUSCULAR; INTRAVENOUS at 22:56

## 2024-03-26 RX ADMIN — IPRATROPIUM BROMIDE AND ALBUTEROL SULFATE 3 ML: 2.5; .5 SOLUTION RESPIRATORY (INHALATION) at 23:55

## 2024-03-26 RX ADMIN — METHYLPREDNISOLONE SODIUM SUCCINATE 125 MG: 125 INJECTION, POWDER, FOR SOLUTION INTRAMUSCULAR; INTRAVENOUS at 22:56

## 2024-03-26 RX ADMIN — FAMOTIDINE 20 MG: 10 INJECTION, SOLUTION INTRAVENOUS at 22:55

## 2024-03-26 RX ADMIN — SODIUM CHLORIDE 1000 ML: 0.9 INJECTION, SOLUTION INTRAVENOUS at 22:56

## 2024-03-26 RX ADMIN — EPINEPHRINE 0.3 MG: 1 INJECTION, SOLUTION, CONCENTRATE INTRAVENOUS at 22:59

## 2024-03-26 NOTE — Clinical Note
Savi Newman accompanied Dawit Segura to the emergency department on 3/26/2024.    Return date if applicable: 03/28/2024        If you have any questions or concerns, please don't hesitate to call.      Luis Messer MD

## 2024-03-27 VITALS
SYSTOLIC BLOOD PRESSURE: 148 MMHG | RESPIRATION RATE: 19 BRPM | TEMPERATURE: 97.2 F | HEART RATE: 101 BPM | OXYGEN SATURATION: 99 % | DIASTOLIC BLOOD PRESSURE: 83 MMHG

## 2024-03-27 NOTE — DISCHARGE INSTRUCTIONS
Never eat sesame or food products containing sesame ever again.    For mild allergic reactions, you can take pepcid 20mg every 12 hours and benadryl 25-50mg every 6 hours

## 2024-03-27 NOTE — ED PROVIDER NOTES
History  Chief Complaint   Patient presents with    Allergic Reaction     Pt presents to the ed after eating sesame seeds hx of allergy to sesame c/o tightness in the throat, facial swelling and diarrhea      The patient reports that he developed an itchy rash, throat tightness, and a muffled voice shortly after eating sesame seed containing products.  He notes that 20 years ago he ate sesame seeds and was covered in hives.  He has avoided them since then until tonight.  He does not feel like passing out.  He not feel short of breath.  He reports his entire body is itchy and she is having redness as well as coming and going raised welts.  He does complain of vomiting and diarrhea that also happened after eating food.      Allergic Reaction      Prior to Admission Medications   Prescriptions Last Dose Informant Patient Reported? Taking?   aspirin 325 mg tablet  Self No No   Sig: Take 1 tablet (325 mg total) by mouth 2 (two) times a day   ibuprofen (MOTRIN) 600 mg tablet  Self No No   Sig: Take 1 tablet (600 mg total) by mouth every 6 (six) hours as needed for mild pain   losartan (COZAAR) 100 MG tablet  Self No No   Sig: TAKE 1 TABLET BY MOUTH EVERY DAY   Patient taking differently: Take by mouth daily after lunch   montelukast (SINGULAIR) 10 mg tablet   No No   Sig: Take 1 tablet (10 mg total) by mouth daily at bedtime      Facility-Administered Medications: None       Past Medical History:   Diagnosis Date    Hyperlipidemia     Hypertension     Sleep apnea     NO CPAP    Tremors of nervous system     per [facility]    Vitamin D deficiency disease        Past Surgical History:   Procedure Laterality Date    COLONOSCOPY      DC OPEN TX TRIMALLEOLAR ANKLE FX W/O FIXJ PST LIP Right 2/20/2024    Procedure: OPEN REDUCTION W/ INTERNAL FIXATION (ORIF) ANKLE;  Surgeon: Mason Connolly DO;  Location: [facility];  Service: Orthopedics       Family History   Problem Relation Age of Onset    Colon cancer Brother     Stroke Brother      Heart disease Brother         pacemaker in situ     I have reviewed and agree with the history as documented.    E-Cigarette/Vaping    E-Cigarette Use Never User      E-Cigarette/Vaping Substances    Nicotine No     THC No     CBD No     Flavoring No     Other No     Unknown No      Social History     Tobacco Use    Smoking status: Never    Smokeless tobacco: Never   Vaping Use    Vaping status: Never Used   Substance Use Topics    Alcohol use: Not Currently     Comment: No use     Drug use: Never     Comment: No use       Review of Systems   All other systems reviewed and are negative.      Physical Exam  Physical Exam  Vitals and nursing note reviewed.   Constitutional:       General: He is in acute distress.      Appearance: He is well-developed.   HENT:      Head: Normocephalic and atraumatic.      Mouth/Throat:      Comments: Muffled voice  Eyes:      Conjunctiva/sclera: Conjunctivae normal.   Cardiovascular:      Rate and Rhythm: Normal rate and regular rhythm.      Heart sounds: No murmur heard.  Pulmonary:      Effort: Pulmonary effort is normal. No respiratory distress.      Breath sounds: Normal breath sounds. No wheezing.   Abdominal:      Palpations: Abdomen is soft.      Tenderness: There is no abdominal tenderness.   Musculoskeletal:         General: No swelling.      Cervical back: Neck supple.   Skin:     General: Skin is warm and dry.      Capillary Refill: Capillary refill takes less than 2 seconds.      Comments: Generalized urticaria   Neurological:      General: No focal deficit present.      Mental Status: He is alert.   Psychiatric:         Mood and Affect: Mood normal.         Vital Signs  ED Triage Vitals   Temperature Pulse Respirations Blood Pressure SpO2   03/26/24 2308 03/26/24 2300 03/26/24 2300 03/26/24 2308 03/26/24 2300   (!) 97.2 °F (36.2 °C) (!) 114 (!) 23 135/94 96 %      Temp Source Heart Rate Source Patient Position - Orthostatic VS BP Location FiO2 (%)   03/26/24 2308 -- --  -- --   Oral          Pain Score       --                  Vitals:    03/26/24 2300 03/26/24 2308 03/26/24 2358   BP:  135/94    Pulse: (!) 114  103         Visual Acuity      ED Medications  Medications   EPINEPHrine PF (ADRENALIN) 1 mg/mL injection 0.3 mg (0.3 mg Intramuscular Given 3/26/24 2259)   methylPREDNISolone sodium succinate (Solu-MEDROL) injection 125 mg (125 mg Intravenous Given 3/26/24 2256)   diphenhydrAMINE (BENADRYL) injection 50 mg (50 mg Intravenous Given 3/26/24 2256)   Famotidine (PF) (PEPCID) injection 20 mg (20 mg Intravenous Given 3/26/24 2255)   sodium chloride 0.9 % bolus 1,000 mL (1,000 mL Intravenous New Bag 3/26/24 2256)   ipratropium-albuterol (DUO-NEB) 0.5-2.5 mg/3 mL inhalation solution 3 mL (3 mL Nebulization Given 3/26/24 2355)       Diagnostic Studies  Results Reviewed       None                   No orders to display              Procedures  CriticalCare Time    Date/Time: 3/27/2024 12:17 AM    Performed by: Danielito Rodriguez MD  Authorized by: Danielito Rodriguez MD    Critical care provider statement:     Critical care time (minutes):  40    Critical care time was exclusive of:  Separately billable procedures and treating other patients    Critical care was necessary to treat or prevent imminent or life-threatening deterioration of the following conditions:  Respiratory failure    Critical care was time spent personally by me on the following activities:  Obtaining history from patient or surrogate, development of treatment plan with patient or surrogate, evaluation of patient's response to treatment, examination of patient and ordering and performing treatments and interventions           ED Course  ED Course as of 03/27/24 0017   Tue Mar 26, 2024   2257 I was called to see patient immediately due to severe allergic reaction.  Patient has muffled voice, tachycardia, and hives.  He reports throat swelling.  Epi and additional medicaiton ordered immediately.   2301 EKG: ST at  114, normal QRS, non specific ST-t changes, QTc 454   2310 I have been back into see the patient at least 4 times now.  Every time he has continued improvement.  His voice is still not completely returned to normal so we will order nebulizer as well.   2332 Voice continues to improve but still not yet normal.  He reports throat tightness nearly completely resolved.   2354 Patient continues to feel improved.  I discussed with him need to avoid all sesame and sesame containing products in the future, I also discussed his next allergic reaction could be even more severe.  I discussed the need to have an EpiPen available at all times and proper use including immediately returning to the hospital after using.   Wed Mar 27, 2024   0014 Case signed out to oncoming provider at change of shift re-evaluation to exclude rebound symptoms after epi.                                 SBIRT 22yo+      Flowsheet Row Most Recent Value   Initial Alcohol Screen: US AUDIT-C     1. How often do you have a drink containing alcohol? 0 Filed at: 03/26/2024 2359   2. How many drinks containing alcohol do you have on a typical day you are drinking?  0 Filed at: 03/26/2024 2359   3b. FEMALE Any Age, or MALE 65+: How often do you have 4 or more drinks on one occassion? 0 Filed at: 03/26/2024 2359   Audit-C Score 0 Filed at: 03/26/2024 2359   MORELIA: How many times in the past year have you...    Used an illegal drug or used a prescription medication for non-medical reasons? Never Filed at: 03/26/2024 2359                      Medical Decision Making  I had the patient who complains of throat tightness and has muffled voice concerning for severe anaphylaxis.  Will immediately treat for severe allergic reaction/anaphylaxis with epinephrine, Solu-Medrol, Pepcid, Benadryl.  I specifically discussed with nurse need for immediate epinephrine.    Risk  Prescription drug management.             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  [place] [id number] [phone]              Patient's Medications   Discharge Prescriptions    EPINEPHRINE (EPIPEN) 0.3 MG/0.3 ML SOAJ    Inject 0.3 mL (0.3 mg total) into a muscle once as needed for anaphylaxis (severe allergic reaction) for up to 1 dose After using, call 911 and come to hospital right away.       Start Date: 3/26/2024 End Date: --       Order Dose: 0.3 mg       Quantity: 0.6 mL    Refills: 0    PREDNISONE 20 MG TABLET    Take 3 tablets (60 mg total) by mouth daily for 5 days       Start Date: 3/26/2024 End Date: 3/31/2024       Order Dose: 60 mg       Quantity: 12 tablet    Refills: 0       No discharge procedures on file.    PDMP Review       None            ED Provider  Electronically Signed by             Danielito Rodriguez MD  03/27/24 0017

## 2024-03-27 NOTE — ED CARE HANDOFF
Emergency Department Sign Out Note        Sign out and transfer of care from Dr. Rodriguez. See Separate Emergency Department note.     The patient, Dawit Segura, was evaluated by the previous provider for allergic reaction, anaphylaxis.    Workup Completed:  Initial evaluation, symptomatic treatment    ED Course / Workup Pending (followup):                                       Procedures  Medical Decision Making  The patient was initially seen by Dr. Rodriguez and I assumed care of the patient at change of shift pending completion of observation after treatment for presumed anaphylaxis to sesame seeds.  This is a 69-year-old male who presented for evaluation of symptoms of anaphylaxis.  He experienced voice change, tachycardia, and diffuse pruritic hives and symptoms of throat swelling.  He was given epinephrine in addition to Solu-Medrol, Benadryl, Pepcid, and DuoNeb.  Patient experienced symptomatic improvement after treatment and was planned to be observed in the ED for 3 to 4 hours to ensure no recurrence of symptoms.  The patient reported a prior reaction to sesame seeds after eating them 20 years ago, consisting of pruritic hives, and he had avoided sesame seeds until ingesting them tonight.  The patient experienced no recurrence of symptoms and was stable throughout observation period with return to baseline.  Patient was previously prescribed EpiPen per Dr. Rodriguez. I discussed all findings, treatment, red flags/return precautions, and outpatient follow-up and the patient/family understand and agree. Stable for discharge.    Risk  Prescription drug management.              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                   Follow-up Information       Follow up With Specialties Details Why Contact Info Additional Information    Snow Middleton MD Family Medicine Call in 1 week For follow-up [address] [place] [address] [phone]       [facility] Emergency Department Emergency Medicine Go to  If symptoms worsen [address] [address] [phone] [facility] Emergency Department, [address] [address]          Discharge Medication List as of 3/26/2024 11:53 PM        START taking these medications    Details   EPINEPHrine (EPIPEN) 0.3 mg/0.3 mL SOAJ Inject 0.3 mL (0.3 mg total) into a muscle once as needed for anaphylaxis (severe allergic reaction) for up to 1 dose After using, call 911 and come to hospital right away., Starting Tue 3/26/2024, Normal      predniSONE 20 mg tablet Take 3 tablets (60 mg total) by mouth daily for 5 days, Starting Tue 3/26/2024, Until Sun 3/31/2024, Normal           CONTINUE these medications which have NOT CHANGED    Details   aspirin 325 mg tablet Take 1 tablet (325 mg total) by mouth 2 (two) times a day, Starting Tue 2/20/2024, Until Tue 4/2/2024, Normal      ibuprofen (MOTRIN) 600 mg tablet Take 1 tablet (600 mg total) by mouth every 6 (six) hours as needed for mild pain, Starting Sat 2/10/2024, Normal      losartan (COZAAR) 100 MG tablet TAKE 1 TABLET BY MOUTH EVERY DAY, Normal      montelukast (SINGULAIR) 10 mg tablet Take 1 tablet (10 mg total) by mouth daily at bedtime, Starting Tue 3/14/2023, Until Tue 2/20/2024, Normal           No discharge procedures on file.       ED Provider  Electronically Signed by     Luis Messer MD  03/27/24 1664

## 2024-03-28 LAB
ATRIAL RATE: 114 BPM
P AXIS: 59 DEGREES
PR INTERVAL: 170 MS
QRS AXIS: 40 DEGREES
QRSD INTERVAL: 96 MS
QT INTERVAL: 330 MS
QTC INTERVAL: 454 MS
T WAVE AXIS: 66 DEGREES
VENTRICULAR RATE: 114 BPM

## 2024-03-28 PROCEDURE — 93010 ELECTROCARDIOGRAM REPORT: CPT | Performed by: INTERNAL MEDICINE

## 2024-04-15 ENCOUNTER — OFFICE VISIT (OUTPATIENT)
Dept: OBGYN CLINIC | Facility: CLINIC | Age: 69
End: 2024-04-15

## 2024-04-15 ENCOUNTER — APPOINTMENT (OUTPATIENT)
Dept: RADIOLOGY | Facility: AMBULARY SURGERY CENTER | Age: 69
End: 2024-04-15
Attending: STUDENT IN AN ORGANIZED HEALTH CARE EDUCATION/TRAINING PROGRAM
Payer: COMMERCIAL

## 2024-04-15 VITALS — HEIGHT: 69 IN | BODY MASS INDEX: 30.36 KG/M2 | WEIGHT: 205 LBS

## 2024-04-15 DIAGNOSIS — S82.851A CLOSED TRIMALLEOLAR FRACTURE OF RIGHT ANKLE, INITIAL ENCOUNTER: ICD-10-CM

## 2024-04-15 DIAGNOSIS — S82.851A CLOSED TRIMALLEOLAR FRACTURE OF RIGHT ANKLE, INITIAL ENCOUNTER: Primary | ICD-10-CM

## 2024-04-15 PROCEDURE — 73610 X-RAY EXAM OF ANKLE: CPT

## 2024-04-15 PROCEDURE — 99024 POSTOP FOLLOW-UP VISIT: CPT | Performed by: STUDENT IN AN ORGANIZED HEALTH CARE EDUCATION/TRAINING PROGRAM

## 2024-04-15 NOTE — PROGRESS NOTES
Orthopaedics Office Visit -new patient Visit    ASSESSMENT/PLAN:    Assessment:   Right closed trimalleolar fracture, DOI: 2/10/2024  S/P ORIF right ankle 2/20/24    Plan:   X-rays reviewed and discussed with patient revealing maintained alignment of pt's previous trimalleolar ankle fracture with no signs of hardware failure or loosening.  There is interval healing noted  Patient has been ambulating AGAINST MEDICAL ADVICE.  The patient has not initiated formal physical therapy which was prescribed at his last visit.  He has discontinued the cam boot on his own for the last several weeks and is transferred back into a regular shoe.  He is also not been taking his aspirin for DVT prophylaxis.  Will begin to clear the patient for weightbearing to the right lower extremity  ROMAT right ankle  Work note given  Pt to start PT for ROM and strengthening exercises of the ankle, new referral sent.  Stressed the importance of physical therapy to restore range of motion to prevent long-term disability  he is able to shower and submerge his incisions at this time  Pt to can discontinue ASA for dvt ppx  Pt to continue at home analgesic regimen with Tylenol and ibuprofen for pain control.  Pt to follow up in 6 weeks for repeat x-ray and re-evaluation      _____________________________________________________  CHIEF COMPLAINT:  Chief Complaint   Patient presents with    Right Ankle - Post-op         SUBJECTIVE:  Dawit Segura is a 69 y.o. male who presents 5 days status post right trimalleolar ankle fracture. He has been compliant with his nonweightbearing status.  Patient reports tripping down 3 stairs while carrying laundry down at his house and landing on his left ankle.  He states he felt immediate pain and swelling after the incident.  He states most of his pain is concentrated diffusely across the right ankle.  He states the pain is exacerbated with any attempts at movement and alleviated with rest.  He has been taking advil  for pain control which provides him with adequate relief. He denies any previous trauma or surgery to the ankle. He denies any numbness or paresthesias in the right lower extremity.    Interval history 3/4/2024  Presents approximately 2 weeks status post ORIF right ankle from a closed right trimalleolar fracture.  He has been compliant with his nonweightbearing status and presents today in a wheelchair.  He endorses mild pain diffusely around the right ankle and swelling, especially over the lateral aspect of the ankle.  He states his pain today is a 3/10. He states he has not started PT yet.  He denies any numbness tingling or paresthesias in the right lower extremity.  Denies fevers or chills    Interval history 4/15/2024  Presents approximately 8 weeks status post ORIF right ankle from a closed right trimalleolar fracture.  He has not been compliant with his nonweightbearing status, he states he walks around the house without the boot and puts weight on the foot. He stopped using the boot several weeks ago. He also has not been compliant with dvt ppx. He stopped taking asa 4 weeks ago. He has not started PT yet, he states he was waiting until this appointment.  It was prescribed at his last appointment. he endorses mild pain over the lateral ankle.  He denies any numbness tingling or paresthesias in the right lower extremity.  Denies fevers or chills    PAST MEDICAL HISTORY:  Past Medical History:   Diagnosis Date    Hyperlipidemia     Hypertension     Sleep apnea     NO CPAP    Tremors of nervous system     per [facility]    Vitamin D deficiency disease        PAST SURGICAL HISTORY:  Past Surgical History:   Procedure Laterality Date    COLONOSCOPY      [place] OPEN TX TRIMALLEOLAR ANKLE FX W/O FIXJ PST LIP Right 2/20/2024    Procedure: OPEN REDUCTION W/ INTERNAL FIXATION (ORIF) ANKLE;  Surgeon: Mason Connolly DO;  Location[facility];  Service: Orthopedics       FAMILY HISTORY:  Family History   Problem Relation Age  "of Onset    Colon cancer Brother     Stroke Brother     Heart disease Brother         pacemaker in situ       SOCIAL HISTORY:  Social History     Tobacco Use    Smoking status: Never    Smokeless tobacco: Never   Vaping Use    Vaping status: Never Used   Substance Use Topics    Alcohol use: Not Currently     Comment: No use     Drug use: Never     Comment: No use       MEDICATIONS:    Current Outpatient Medications:     EPINEPHrine (EPIPEN) 0.3 mg/0.3 mL SOAJ, Inject 0.3 mL (0.3 mg total) into a muscle once as needed for anaphylaxis (severe allergic reaction) for up to 1 dose After using, call 911 and come to hospital right away., Disp: 0.6 mL, Rfl: 0    ibuprofen (MOTRIN) 600 mg tablet, Take 1 tablet (600 mg total) by mouth every 6 (six) hours as needed for mild pain, Disp: 20 tablet, Rfl: 0    losartan (COZAAR) 100 MG tablet, TAKE 1 TABLET BY MOUTH EVERY DAY (Patient taking differently: Take by mouth daily after lunch), Disp: 90 tablet, Rfl: 3    aspirin 325 mg tablet, Take 1 tablet (325 mg total) by mouth 2 (two) times a day, Disp: 84 tablet, Rfl: 0    montelukast (SINGULAIR) 10 mg tablet, Take 1 tablet (10 mg total) by mouth daily at bedtime, Disp: 90 tablet, Rfl: 1    ALLERGIES:  Allergies   Allergen Reactions    Sesame Seed (Diagnostic) - Food Allergy Anaphylaxis       REVIEW OF SYSTEMS:  MSK: Left ankle pain  Neuro: None  Pertinent items are otherwise noted in HPI.  A comprehensive review of systems was otherwise negative.    LABS:  HgA1c: No results found for: \"HGBA1C\"  BMP:   Lab Results   Component Value Date    CALCIUM 9.1 02/15/2024    K 4.3 02/15/2024    CO2 29 02/15/2024     02/15/2024    BUN 20 02/15/2024    CREATININE 0.77 02/15/2024     CBC: No components found for: \"CBC\"    _____________________________________________________  PHYSICAL EXAMINATION:  Vital signs: Ht 5' 9\" (1.753 m)   Wt 93 kg (205 lb)   BMI 30.27 kg/m²   General: No acute distress, awake and alert  Psychiatric: Mood and " affect appear appropriate  HEENT: Trachea Midline, No torticollis, no apparent facial trauma  Cardiovascular: No audible murmurs; Extremities appear perfused  Pulmonary: No audible wheezing or stridor  Skin: No open lesions; see further details (if any) below    MUSCULOSKELETAL EXAMINATION:  Extremities: Right lower extremity    The right lower extremity was exposed and inspected.  Surgical incisions are healed without erythema, drainage or signs of dehiscence. All other visible skin intact without erythema, ecchymosis, effusion or obvious osseous deformity.  He has mild tenderness over the medial malleolus. He is NTTP over the lateral malleolus. Pt able to range ankle from 30 degrees of plantarflexion and 5 degrees of dorsiflexion. Sensation intact to superficial peroneal, deep peroneal, sural, saphenous, plantar nerve distributions. Motor intact to extensor hallux longus, tibialis anterior, gastrocnemius muscles, extensor mechanism intact. Limb is well perfused. Brisk capillary refill in all 5 digits. Compartments soft and compressible.       _____________________________________________________  STUDIES REVIEWED:  I personally reviewed the images and interpretation is as follows:  X-rays right ankle reveal: Maintained alignment of patient's previous right trimalleolar ankle fracture.  No signs of hardware failure or loosening.  Ankle mortise well-maintained.  Syndesmotic relationships well-maintained. Interval healing noted    PROCEDURES PERFORMED:  Procedures none    Yunior Devlin MD

## 2024-04-24 ENCOUNTER — EVALUATION (OUTPATIENT)
Dept: PHYSICAL THERAPY | Facility: CLINIC | Age: 69
End: 2024-04-24
Payer: COMMERCIAL

## 2024-04-24 DIAGNOSIS — S82.851A CLOSED TRIMALLEOLAR FRACTURE OF RIGHT ANKLE, INITIAL ENCOUNTER: ICD-10-CM

## 2024-04-24 PROCEDURE — 97110 THERAPEUTIC EXERCISES: CPT

## 2024-04-24 PROCEDURE — 97162 PT EVAL MOD COMPLEX 30 MIN: CPT

## 2024-04-24 NOTE — PROGRESS NOTES
PT Evaluation     Today's date: 2024  Patient name: Dawit Segura  : 1955  MRN: [id number]  Referring provider: Mason Connolly DO  Dx:   Encounter Diagnosis     ICD-10-CM    1. Closed trimalleolar fracture of right ankle, initial encounter  S82.851A Ambulatory Referral to Physical Therapy                     Assessment  Assessment details: Dawit Segura is a pleasant 69 y.o. male presents with signs and symptoms consistent with:   Closed trimalleolar fracture of right ankle, initial encounter    Problem List:  1) Dorsiflexion  2) Strength    Comparable signs:  1) Walking  2) Stairs    he has hypomobility with dorsiflexion, rear foot mobility, decreased ankle strength, and impaired gait resulting in worry over not knowing what's wrong and fear of not being able to keep active. These impairments listed above are preventing the patient from participating in functional activity. No further referral appears necessary at this time based upon examination results, and is negative for any red flags. Prognosis is good based off HEP compliance and attendance to physical therapy 2x a week.  Positive prognostic indicators include positive attitude toward recovery and good understanding of diagnosis and treatment plan options.  Negative prognostic indicators include chronicity of symptoms and hypertension.  Patent will benefit from skilled physical therapy at this time to address deficits to improve overall function and return to PLOF. Patient verbalized understanding of POC, HEP, and return demonstrated HEP. All questions were answered to patients satisfaction.     Please contact me if you have any questions or recommendations. Thank you for the referral and the opportunity to share in Dawit Segura's care.              Impairments: abnormal coordination, abnormal gait, abnormal muscle firing, abnormal or restricted ROM, abnormal movement, activity intolerance, impaired balance, impaired physical strength, lacks  appropriate home exercise program, pain with function, safety issue, weight-bearing intolerance and poor body mechanics  Understanding of Dx/Px/POC: good   Prognosis: good    Goals  Impairment Goals 4-6 weeks   In order to maximize function patient will be able to...   - Decrease intensity/duration/frequency of pain to 2/10  - Demonstrate 20 degrees of Dorsiflexion for normal gait  - Demonstrate 60 degrees of great toe extension for normal gait  - Increase hip strength to 5/5 throughout  - Increase ankle strength to 5/5 throughout   - Demonstrate a SLS on uneven and even surfaces for 30 seconds without compensations     Functional Goals 6-8 weeks  In order to return to prior level of function patient will be able to...   - Participate in ADL's/IADL's/sport specific activities with no greater than 2/10 pain.    - Increase Functional Status Measure (FOTO) to predicted outcome scores  - Demonstrate independence and compliant with HEP  - Demonstrate a squat and or sit to stand with good mechanics and eccentric control without pain/difficulty/compensation  - Ascend and descend stairs without increased pain/compensation/difficulty and a reciprocal gait pattern.  - Patient will be able to demonstrate good gait mechanics without compensations.   - Demonstrate ankle stability on even and uneven surfaces as seen by minimal to no compensations or LOB in order to progress patient to running and higher level activities        Plan  Patient would benefit from: skilled PT  Planned modality interventions: cryotherapy, electrical stimulation/Russian stimulation, TENS and thermotherapy: hydrocollator packs  Planned therapy interventions: joint mobilization, manual therapy, neuromuscular re-education, patient education, strengthening, stretching, therapeutic activities, therapeutic exercise, home exercise program, functional ROM exercises, Adkins taping, postural training, gait training, balance, balance/weight bearing training,  flexibility, graded exercise and motor coordination training  Frequency: 2x week  Duration in weeks: 12  Treatment plan discussed with: patient        Subjective Evaluation    History of Present Illness  Mechanism of injury: Patient presents to PT s/p R tri maleolar ORIF on  with Dr. Collier. He fell down the stairs and broke his ankle. Patient is currently in a walking boot, and sometimes will put weight on it. He reports some pain with walking, and stairs. He is currently a  but is not working. As of last MD visit he is cleared for WB and ROM. He is no longer taking any pain medication. He is not cleared for driving.     Difficulty with: up and down stairs, walking long distances.   Patient Goals  Patient goals for therapy: decreased pain, independence with ADLs/IADLs and return to work  Patient goal: able to do every day tasks  Pain  Current pain ratin  At best pain ratin  At worst pain ratin  Location: right round the ankle  Quality: dull ache, tight and pressure  Aggravating factors: stair climbing, walking and standing      Diagnostic Tests  Abnormal x-ray: see chart for xray.  Treatments  No previous or current treatments      Objective     Active Range of Motion     Right Ankle/Foot   Dorsiflexion (ke): -5 degrees   Plantar flexion: 35 degrees   Inversion: 18 degrees   Eversion: -10 degrees   Great toe extension: 80 degrees     Strength/Myotome Testing     Right Ankle/Foot   Great toe flexion: 4-  Great toe extension: 4    Additional Strength Details  PF not tested  Right          Fib Brevis 4/5  Fib Longus 4-/5  Ant Tib 4-/5  Post Tib 4-/5    Left:  Fib Brevis 5/5  Fib Longus 5/5  Ant Tib 5/5  Post Tib 5/5      Swelling   Left Ankle/Foot   Metatarsal heads: 21 cm  Figure 8: 52 cm  [place]: 21 cm    Right Ankle/Foot   Metatarsal heads: 24 cm  Figure 8: 57 cm  [place]: 26 cm             POC Expires Auth Status Start Date Expiration Date PT Visit Limit           Date       "  Used        Remaining           Diagnosis:  L ankle tri mal ORIF 2/20/24   Precautions:  WBAT in boot   Comparable signs 1) Steps  2) Walking   Primary Impairments: 1) Dorsiflexion  2) Strength    Patient Goals Walk normal    Manual Therapy 4/24        TCJ mobs SP         Ankle PROM SP        Rearfoot mobs         Great toe mobs         Forefoot mobs         Edema  SP         IASTM/EPAT         Re-evaluation          Exercise Diary          Therapeutic Exercise         Bike/TM         Ankle DF mob         Gas/Soleus St 10x10\" ea        Heel Raises                                    Neuromuscular Re-education         Short foot         Toe yoga 10x        SLS          Wobble board         RDL                                     Therapeutic Activities         Education  POC, diagnosis, expecations        SF side steps                                    Modalities                         "

## 2024-04-29 ENCOUNTER — OFFICE VISIT (OUTPATIENT)
Dept: FAMILY MEDICINE CLINIC | Facility: CLINIC | Age: 69
End: 2024-04-29
Payer: COMMERCIAL

## 2024-04-29 VITALS
WEIGHT: 202.2 LBS | SYSTOLIC BLOOD PRESSURE: 126 MMHG | BODY MASS INDEX: 29.95 KG/M2 | OXYGEN SATURATION: 96 % | DIASTOLIC BLOOD PRESSURE: 74 MMHG | HEART RATE: 80 BPM | HEIGHT: 69 IN | TEMPERATURE: 98.2 F

## 2024-04-29 DIAGNOSIS — Z00.00 ANNUAL PHYSICAL EXAM: ICD-10-CM

## 2024-04-29 DIAGNOSIS — J30.1 SEASONAL ALLERGIC RHINITIS DUE TO POLLEN: ICD-10-CM

## 2024-04-29 DIAGNOSIS — I10 ESSENTIAL HYPERTENSION: ICD-10-CM

## 2024-04-29 DIAGNOSIS — J31.0 CHRONIC RHINITIS: ICD-10-CM

## 2024-04-29 DIAGNOSIS — Z00.00 ROUTINE MEDICAL EXAM: Primary | ICD-10-CM

## 2024-04-29 DIAGNOSIS — S82.851A CLOSED TRIMALLEOLAR FRACTURE OF RIGHT ANKLE, INITIAL ENCOUNTER: ICD-10-CM

## 2024-04-29 DIAGNOSIS — Z12.11 SCREENING FOR COLON CANCER: ICD-10-CM

## 2024-04-29 DIAGNOSIS — Z12.5 SCREENING FOR PROSTATE CANCER: ICD-10-CM

## 2024-04-29 DIAGNOSIS — R53.83 OTHER FATIGUE: ICD-10-CM

## 2024-04-29 PROCEDURE — 1159F MED LIST DOCD IN RCRD: CPT | Performed by: FAMILY MEDICINE

## 2024-04-29 PROCEDURE — 1160F RVW MEDS BY RX/DR IN RCRD: CPT | Performed by: FAMILY MEDICINE

## 2024-04-29 PROCEDURE — 3725F SCREEN DEPRESSION PERFORMED: CPT | Performed by: FAMILY MEDICINE

## 2024-04-29 PROCEDURE — 99397 PER PM REEVAL EST PAT 65+ YR: CPT | Performed by: FAMILY MEDICINE

## 2024-04-29 RX ORDER — MONTELUKAST SODIUM 10 MG/1
10 TABLET ORAL
Qty: 90 TABLET | Refills: 0 | Status: SHIPPED | OUTPATIENT
Start: 2024-04-29 | End: 2024-07-28

## 2024-04-29 NOTE — PATIENT INSTRUCTIONS
Wellness Visit for Adults   AMBULATORY CARE:   A wellness visit  is when you see your healthcare provider to get screened for health problems. Your healthcare provider will also give you advice on how to stay healthy. Write down your questions so you remember to ask them. Ask your healthcare provider how often you should have a wellness visit.  What happens at a wellness visit:  Your healthcare provider will ask about your health, and your family history of health problems. This includes high blood pressure, heart disease, and cancer. He or she will ask if you have symptoms that concern you, if you smoke, and about your mood. You may also be asked about your intake of medicines, supplements, food, and alcohol. Any of the following may be done:  Your weight  will be checked. Your height may also be checked so your body mass index (BMI) can be calculated. Your BMI shows if you are at a healthy weight.    Your blood pressure  and heart rate will be checked. Your temperature may also be checked.    Blood and urine tests  may be done. Blood tests may be done to check your cholesterol levels. Abnormal cholesterol levels increase your risk for heart disease and stroke. You may also need a blood or urine test to check for diabetes if you are at increased risk. Urine tests may be done to look for signs of an infection or kidney disease.    A physical exam  includes checking your heartbeat and lungs with a stethoscope. Your healthcare provider may also check your skin to look for sun damage.    Screening tests  may be recommended. A screening test is done to check for diseases that may not cause symptoms. The screening tests you may need depend on your age, gender, family history, and lifestyle habits. For example, colorectal screening may be recommended if you are 50 years old or older.    Screening tests you need if you are a woman:   A Pap smear  is used to screen for cervical cancer. Pap smears are usually done every 3 to  5 years depending on your age. You may need them more often if you have had abnormal Pap smear test results in the past. Ask your healthcare provider how often you should have a Pap smear.    A mammogram  is an x-ray of your breasts to screen for breast cancer. Experts recommend mammograms every 2 years starting at age 50 years. You may need a mammogram at age 49 years or younger if you have an increased risk for breast cancer. Talk to your healthcare provider about when you should start having mammograms and how often you need them.    Vaccines you may need:   Get an influenza vaccine  every year. The influenza vaccine protects you from the flu. Several types of viruses cause the flu. The viruses change over time, so new vaccines are made each year.    Get a tetanus-diphtheria (Td) booster vaccine  every 10 years. This vaccine protects you against tetanus and diphtheria. Tetanus is a severe infection that may cause painful muscle spasms and lockjaw. Diphtheria is a severe bacterial infection that causes a thick covering in the back of your mouth and throat.    Get a human papillomavirus (HPV) vaccine  if you are female and aged 19 to 26 or male 19 to 21 and never received it. This vaccine protects you from HPV infection. HPV is the most common infection spread by sexual contact. HPV may also cause vaginal, penile, and anal cancers.    Get a pneumococcal vaccine  if you are aged 65 years or older. The pneumococcal vaccine is an injection given to protect you from pneumococcal disease. Pneumococcal disease is an infection caused by pneumococcal bacteria. The infection may cause pneumonia, meningitis, or an ear infection.    Get a shingles vaccine  if you are 60 or older, even if you have had shingles before. The shingles vaccine is an injection to protect you from the varicella-zoster virus. This is the same virus that causes chickenpox. Shingles is a painful rash that develops in people who had chickenpox or have  been exposed to the virus.    How to eat healthy:  My Plate is a model for planning healthy meals. It shows the types and amounts of foods that should go on your plate. Fruits and vegetables make up about half of your plate, and grains and protein make up the other half. A serving of dairy is included on the side of your plate. The amount of calories and serving sizes you need depends on your age, gender, weight, and height. Examples of healthy foods are listed below:  Eat a variety of vegetables  such as dark green, red, and orange vegetables. You can also include canned vegetables low in sodium (salt) and frozen vegetables without added butter or sauces.    Eat a variety of fresh fruits , canned fruit in 100% juice, frozen fruit, and dried fruit.    Include whole grains.  At least half of the grains you eat should be whole grains. Examples include whole-wheat bread, wheat pasta, brown rice, and whole-grain cereals such as oatmeal.    Eat a variety of protein foods such as seafood (fish and shellfish), lean meat, and poultry without skin (turkey and chicken). Examples of lean meats include pork leg, shoulder, or tenderloin, and beef round, sirloin, tenderloin, and extra lean ground beef. Other protein foods include eggs and egg substitutes, beans, peas, soy products, nuts, and seeds.    Choose low-fat dairy products such as skim or 1% milk or low-fat yogurt, cheese, and cottage cheese.    Limit unhealthy fats  such as butter, hard margarine, and shortening.       Exercise:  Exercise at least 30 minutes per day on most days of the week. Some examples of exercise include walking, biking, dancing, and swimming. You can also fit in more physical activity by taking the stairs instead of the elevator or parking farther away from stores. Include muscle strengthening activities 2 days each week. Regular exercise provides many health benefits. It helps you manage your weight, and decreases your risk for type 2 diabetes,  heart disease, stroke, and high blood pressure. Exercise can also help improve your mood. Ask your healthcare provider about the best exercise plan for you.       General health and safety guidelines:   Do not smoke.  Nicotine and other chemicals in cigarettes and cigars can cause lung damage. Ask your healthcare provider for information if you currently smoke and need help to quit. E-cigarettes or smokeless tobacco still contain nicotine. Talk to your healthcare provider before you use these products.    Limit alcohol.  A drink of alcohol is 12 ounces of beer, 5 ounces of wine, or 1½ ounces of liquor.    Lose weight, if needed.  Being overweight increases your risk of certain health conditions. These include heart disease, high blood pressure, type 2 diabetes, and certain types of cancer.    Protect your skin.  Do not sunbathe or use tanning beds. Use sunscreen with a SPF 15 or higher. Apply sunscreen at least 15 minutes before you go outside. Reapply sunscreen every 2 hours. Wear protective clothing, hats, and sunglasses when you are outside.    Drive safely.  Always wear your seatbelt. Make sure everyone in your car wears a seatbelt. A seatbelt can save your life if you are in an accident. Do not use your cell phone when you are driving. This could distract you and cause an accident. Pull over if you need to make a call or send a text message.    Practice safe sex.  Use latex condoms if are sexually active and have more than one partner. Your healthcare provider may recommend screening tests for sexually transmitted infections (STIs).    Wear helmets, lifejackets, and protective gear.  Always wear a helmet when you ride a bike or motorcycle, go skiing, or play sports that could cause a head injury. Wear protective equipment when you play sports. Wear a lifejacket when you are on a boat or doing water sports.    © Copyright [employer] 2023 Information is for End User's use only and may not be sold, redistributed or  otherwise used for commercial purposes.  The above information is an  only. It is not intended as medical advice for individual conditions or treatments. Talk to your doctor, nurse or pharmacist before following any medical regimen to see if it is safe and effective for you.    Cholesterol and Your Health   AMBULATORY CARE:   Cholesterol  is a waxy, fat-like substance. Your body uses cholesterol to make hormones and new cells, and to protect nerves. Cholesterol is made by your body. It also comes from certain foods you eat, such as meat and dairy products. Your healthcare provider can help you set goals for your cholesterol levels. Your provider can help you create a plan to meet your goals.  Cholesterol level goals:  Your cholesterol level goals depend on your risk for heart disease, your age, and your other health conditions. The following are general guidelines:  Total cholesterol  includes low-density lipoprotein (LDL), high-density lipoprotein (HDL), and triglyceride levels. The total cholesterol level should be lower than 200 mg/dL and is best at about 150 mg/dL.    LDL cholesterol  is called bad cholesterol  because it forms plaque in your arteries. As plaque builds up, your arteries become narrow, and less blood flows through. When plaque decreases blood flow to your heart, you may have chest pain. If plaque completely blocks an artery that brings blood to your heart, you may have a heart attack. Plaque can break off and form blood clots. Blood clots may block arteries in your brain and cause a stroke. The level should be less than 130 mg/dL and is best at about 100 mg/dL.         HDL cholesterol  is called good cholesterol  because it helps remove LDL cholesterol from your arteries. It does this by attaching to LDL cholesterol and carrying it to your liver. Your liver breaks down LDL cholesterol so your body can get rid of it. High levels of HDL cholesterol can help prevent a heart attack and  stroke. Low levels of HDL cholesterol can increase your risk for heart disease, heart attack, and stroke. The level should be at least 40 mg/dL in males or at least 50 mg/dL in females.    Triglycerides  are a type of fat that store energy from foods you eat. High levels of triglycerides also cause plaque buildup. This can increase your risk for a heart attack or stroke. If your triglyceride level is high, your LDL cholesterol level may also be high. The level should be less than 150 mg/dL.    Any of the following can increase your risk for high cholesterol:   Smoking or drinking large amounts of alcohol    Having overweight or obesity, or not getting enough exercise    A medical condition such as hypertension (high blood pressure) or diabetes    A family history of high cholesterol    Age older than 65    What you need to know about having your cholesterol levels checked:  Adults 20 to 45 years of age should have their cholesterol levels checked every 4 to 6 years. Adults 45 years or older should have their cholesterol checked every 1 to 2 years. You may need your cholesterol checked more often, or at a younger age, if you have risk factors for heart disease. You may also need to have your cholesterol checked more often if you have other health conditions, such as diabetes. Blood tests are used to check cholesterol levels. Blood tests measure your levels of triglycerides, LDL cholesterol, and HDL cholesterol.  How healthy fats affect your cholesterol levels:  Healthy fats, also called unsaturated fats, help lower LDL cholesterol and triglyceride levels. Healthy fats include the following:  Monounsaturated fats  are found in foods such as olive oil, canola oil, avocado, nuts, and olives.    Polyunsaturated fats,  such as omega 3 fats, are found in fish, such as salmon, trout, and tuna. They can also be found in plant foods such as flaxseed, walnuts, and soybeans.    How unhealthy fats affect your cholesterol levels:   Unhealthy fats increase LDL cholesterol and triglyceride levels. They are found in foods high in cholesterol, saturated fat, and trans fat:  Cholesterol  is found in eggs, dairy, and meat.    Saturated fat  is found in butter, cheese, ice cream, whole milk, and coconut oil. Saturated fat is also found in meat, such as sausage, hot dogs, and bologna.    Trans fat  is found in liquid oils and is used in fried and baked foods. Foods that contain trans fats include chips, crackers, muffins, sweet rolls, microwave popcorn, and cookies.    Treatment  for high cholesterol will also decrease your risk of heart disease, heart attack, and stroke. Treatment may include any of the following:  Lifestyle changes  may include food, exercise, weight loss, and quitting smoking. You may also need to decrease the amount of alcohol you drink. Your healthcare provider will want you to start with lifestyle changes. Other treatment may be added if lifestyle changes are not enough. Your healthcare provider may recommend you work with a team to manage hyperlipidemia. The team may include medical experts such as a dietitian, an exercise or physical therapist, and a behavior therapist. Your family members may be included in helping you create lifestyle changes.    Medicines  may be given to lower your LDL cholesterol, triglyceride levels, or total cholesterol level. You may need medicines to lower your cholesterol if any of the following is true:    You have a history of stroke, TIA, unstable angina, or a heart attack.    Your LDL cholesterol level is 190 mg/dL or higher.    You are age 40 to 75 years, have diabetes or heart disease risk factors, and your LDL cholesterol is 70 mg/dL or higher.    Supplements  include fish oil, red yeast rice, and garlic. Fish oil may help lower your triglyceride and LDL cholesterol levels. It may also increase your HDL cholesterol level. Red yeast rice may help decrease your total cholesterol level and LDL  cholesterol level. Garlic may help lower your total cholesterol level. Do not take any supplements without talking to your healthcare provider.    Food changes you can make to lower your cholesterol levels:  A dietitian can help you create a healthy eating plan. Your dietitian can show you how to read food labels and choose foods low in saturated fat, trans fats, and cholesterol.     Decrease the total amount of fat you eat.  Choose lean meats, fat-free or 1% fat milk, and low-fat dairy products, such as yogurt and cheese. Try to limit or avoid red meats. Limit or do not eat fried foods or baked goods, such as cookies.    Replace unhealthy fats with healthy fats.  Cook foods in olive oil or canola oil. Choose soft margarines that are low in saturated fat and trans fat. Seeds, nuts, and avocados are other examples of healthy fats.    Eat foods with omega-3 fats.  Examples include salmon, tuna, mackerel, walnuts, and flaxseed. Eat fish 2 times per week. Pregnant women should not eat fish that have high levels of mercury, such as shark, swordfish, and maryse mackerel.         Increase the amount of high-fiber foods you eat.  High-fiber foods can help lower your LDL cholesterol. Aim to get between 20 and 30 grams of fiber each day. Fruits and vegetables are high in fiber. Eat at least 5 servings each day. Other high-fiber foods are whole-grain or whole-wheat breads, pastas, or cereals, and brown rice. Eat 3 ounces of whole-grain foods each day. Increase fiber slowly. You may have abdominal discomfort, bloating, and gas if you add fiber to your diet too quickly.         Eat healthy protein foods.  Examples include low-fat dairy products, skinless chicken and turkey, fish, and nuts.    Limit foods and drinks that are high in sugar.  Your dietitian or healthcare provider can help you create daily limits for high-sugar foods and drinks. The limit may be lower if you have diabetes or another health condition. Limits can also  help you lose weight if needed.  Lifestyle changes you can make to lower your cholesterol levels:   Maintain a healthy weight.  Ask your healthcare provider what a healthy weight is for you. Ask your provider to help you create a weight loss plan if needed. Weight loss can decrease your total cholesterol and triglyceride levels. Weight loss may also help keep your blood pressure at a healthy level.    Be physically active throughout the day.  Physical activity, such as exercise, can help lower your total cholesterol level and maintain a healthy weight. Physical activity can also help increase your HDL cholesterol level. Work with your healthcare provider to create an program that is right for you. Get at least 30 to 40 minutes of moderate physical activity most days of the week. Examples of exercise include brisk walking, swimming, or biking. Also include strength training at least 2 times each week. Your healthcare providers can help you create a physical activity plan.            Do not smoke.  Nicotine and other chemicals in cigarettes and cigars can raise your cholesterol levels. Ask your healthcare provider for information if you currently smoke and need help to quit. E-cigarettes or smokeless tobacco still contain nicotine. Talk to your healthcare provider before you use these products.         Limit or do not drink alcohol.  Alcohol can increase your triglyceride levels. Ask your healthcare provider before you drink alcohol. Ask how much is okay for you to drink in 24 hours or 1 week.    Follow up with your doctor as directed:  Write down your questions so you remember to ask them during your visits.  © Copyright [employer] 2023 Information is for End User's use only and may not be sold, redistributed or otherwise used for commercial purposes.  The above information is an  only. It is not intended as medical advice for individual conditions or treatments. Talk to your doctor, nurse or pharmacist  before following any medical regimen to see if it is safe and effective for you.

## 2024-04-29 NOTE — PROGRESS NOTES
ADULT ANNUAL PHYSICAL  [facility] PRIMARY CARE [facility]    NAME: Dawit Segura  AGE: 69 y.o. SEX: male  : 1955     DATE: 2024     Assessment and Plan:routeine  medical exam and other health  conditions  lsited  below     Problem List Items Addressed This Visit          Cardiovascular and Mediastinum    Essential hypertension     Stable   keep logging            Respiratory    Chronic rhinitis    Relevant Medications    montelukast (SINGULAIR) 10 mg tablet    Seasonal allergic rhinitis due to pollen     Much  stable  now   discussed   precautions            Musculoskeletal and Integument    Closed trimalleolar fracture of right ankle     S/p surgery  healed    walks  with  cane in  care of orthopedic  getting the PT.   Discussed precautions  and  care            Other    Routine medical exam - Primary     Discussed  diet  eexrcise   ordered cologurd            Immunizations and preventive care screenings were discussed with patient today. Appropriate education was printed on patient's after visit summary.    Discussed risks and benefits of prostate cancer screening. We discussed the controversial history of PSA screening for prostate cancer in the [place] as well as the risk of over detection and over treatment of prostate cancer by way of PSA screening.  The patient understands that PSA blood testing is an imperfect way to screen for prostate cancer and that elevated PSA levels in the blood may also be caused by infection, inflammation, prostatic trauma or manipulation, urological procedures, or by benign prostatic enlargement.    The role of the digital rectal examination in prostate cancer screening was also discussed and I discussed with him that there is large interobserver variability in the findings of digital rectal examination.    Counseling:  Dental Health: discussed importance of regular tooth brushing, flossing, and dental visits.      Depression  Screening and Follow-up Plan: Patient was screened for depression during today's encounter. They screened negative with a PHQ-2 score of 0.    Falls Plan of Care: balance, strength, and gait training instructions were provided. Recommended assistive device to help with gait and balance. Patient assessed for orthostatic hypotension. Medications that increase falls were reviewed. Vitamin D supplementation was recommended.         No follow-ups on file.     Chief Complaint:     Chief Complaint   Patient presents with    Annual Exam      History of Present Illness:     Adult Annual Physical   Patient here for a comprehensive physical exam. The patient reports  as listed .    Diet and Physical Activity  Diet/Nutrition: well balanced diet.   Exercise: walking.      Depression Screening  PHQ-2/9 Depression Screening    Little interest or pleasure in doing things: 0 - not at all  Feeling down, depressed, or hopeless: 0 - not at all  PHQ-2 Score: 0  PHQ-2 Interpretation: Negative depression screen       General Health  Sleep: sleeps well.   Hearing: normal - bilateral.  Vision: no vision problems.   Dental: no dental visits for >1 year.        Health  Symptoms include: none    Advanced Care Planning  Do you have an advanced directive? no  Do you have a durable medical power of ? no  ACP document given to patient? no     Review of Systems:     Review of Systems   Constitutional:  Negative for fatigue and fever.   HENT:  Negative for congestion and sinus pressure.    Eyes:  Negative for pain, discharge and itching.   Respiratory:  Negative for cough and shortness of breath.    Cardiovascular:  Negative for chest pain, palpitations and leg swelling.        Htn  stable now   Gastrointestinal:  Negative for abdominal distention, abdominal pain, constipation and diarrhea.   Endocrine: Negative for cold intolerance, heat intolerance and polydipsia.   Genitourinary:  Negative for dysuria and flank pain.   Musculoskeletal:   Positive for arthralgias.        Rt ankle fx   Skin:  Negative for rash.   Neurological:  Negative for dizziness and headaches.   Psychiatric/Behavioral:  Negative for sleep disturbance. The patient is not nervous/anxious.       Past Medical History:     Past Medical History:   Diagnosis Date    Hyperlipidemia     Hypertension     Sleep apnea     NO CPAP    Tremors of nervous system     per [facility]    Vitamin D deficiency disease       Past Surgical History:     Past Surgical History:   Procedure Laterality Date    COLONOSCOPY      ID OPEN TX TRIMALLEOLAR ANKLE FX W/O FIXJ PST LIP Right 2024    Procedure: OPEN REDUCTION W/ INTERNAL FIXATION (ORIF) ANKLE;  Surgeon: Mason Connolly DO;  Location: [facility];  Service: Orthopedics      Family History:     Family History   Problem Relation Age of Onset    Colon cancer Brother     Stroke Brother     Heart disease Brother         pacemaker in situ      Social History:     Social History     Socioeconomic History    Marital status: Single     Spouse name: None    Number of children: None    Years of education: 12    Highest education level: High school graduate   Occupational History    Occupation:    Tobacco Use    Smoking status: Never    Smokeless tobacco: Never   Vaping Use    Vaping status: Never Used   Substance and Sexual Activity    Alcohol use: Not Currently     Comment: No use     Drug use: Never     Comment: No use    Sexual activity: None   Other Topics Concern    None   Social History Narrative    · Most recent tobacco use screenin2020      · Were you activated, into active duty, as a member of the [employer] or as a Reservist:   No       · Sexual orientation:   Heterosexual      · Exercise level:   None      · Diet:   Regular      · General stress level:   Low       · Caffeine intake:   Heavy    · Guns present in home:   No      · Seat belts used routinely:   Yes      · Smoke alarm in home:   Yes      · Advance directive:    No      Social Determinants of Health     Financial Resource Strain: Low Risk  (6/2/2020)    Overall Financial Resource Strain (CARDIA)     Difficulty of Paying Living Expenses: Not hard at all   Food Insecurity: No Food Insecurity (6/2/2020)    Hunger Vital Sign     Worried About Running Out of Food in the Last Year: Never true     Ran Out of Food in the Last Year: Never true   Transportation Needs: No Transportation Needs (6/2/2020)    PRAPARE - Transportation     Lack of Transportation (Medical): No     Lack of Transportation (Non-Medical): No   Physical Activity: Sufficiently Active (6/2/2020)    Exercise Vital Sign     Days of Exercise per Week: 5 days     Minutes of Exercise per Session: 60 min   Stress: No Stress Concern Present (6/2/2020)    [employer] - Occupational Stress Questionnaire     Feeling of Stress : Not at all   Social Connections: Socially Isolated (6/2/2020)    Social Connection and Isolation Panel [NHANES]     Frequency of Communication with Friends and Family: Once a week     Frequency of Social Gatherings with Friends and Family: Once a week     Attends Christian Services: Never     Active Member of Clubs or Organizations: No     Attends Club or Organization Meetings: Never     Marital Status: Living with partner   Intimate Partner Violence: Not At Risk (6/2/2020)    Humiliation, Afraid, Rape, and Kick questionnaire     Fear of Current or Ex-Partner: No     Emotionally Abused: No     Physically Abused: No     Sexually Abused: No   Housing Stability: Not on file      Current Medications:     Current Outpatient Medications   Medication Sig Dispense Refill    EPINEPHrine (EPIPEN) 0.3 mg/0.3 mL SOAJ Inject 0.3 mL (0.3 mg total) into a muscle once as needed for anaphylaxis (severe allergic reaction) for up to 1 dose After using, call 911 and come to hospital right away. 0.6 mL 0    ibuprofen (MOTRIN) 600 mg tablet Take 1 tablet (600 mg total) by mouth every 6 (six)  "hours as needed for mild pain 20 tablet 0    losartan (COZAAR) 100 MG tablet TAKE 1 TABLET BY MOUTH EVERY DAY (Patient taking differently: Take by mouth daily after lunch) 90 tablet 3    montelukast (SINGULAIR) 10 mg tablet Take 1 tablet (10 mg total) by mouth daily at bedtime 90 tablet 0    aspirin 325 mg tablet Take 1 tablet (325 mg total) by mouth 2 (two) times a day (Patient not taking: Reported on 4/29/2024) 84 tablet 0     No current facility-administered medications for this visit.      Allergies:     Allergies   Allergen Reactions    Sesame Seed (Diagnostic) - Food Allergy Anaphylaxis      Physical Exam:     /74 (BP Location: Left arm, Patient Position: Sitting, Cuff Size: Standard)   Pulse 80   Temp 98.2 °F (36.8 °C) (Temporal)   Ht 5' 9\" (1.753 m)   Wt 91.7 kg (202 lb 3.2 oz)   SpO2 96%   BMI 29.86 kg/m²     Physical Exam  Vitals and nursing note reviewed.   Constitutional:       General: He is not in acute distress.     Appearance: Normal appearance. He is not ill-appearing, toxic-appearing or diaphoretic.   HENT:      Head: Normocephalic and atraumatic.      Nose: Nose normal. No congestion.      Mouth/Throat:      Mouth: Mucous membranes are moist.      Pharynx: Oropharynx is clear. No oropharyngeal exudate or posterior oropharyngeal erythema.   Eyes:      Extraocular Movements: Extraocular movements intact.      Conjunctiva/sclera: Conjunctivae normal.      Pupils: Pupils are equal, round, and reactive to light.   Cardiovascular:      Rate and Rhythm: Normal rate and regular rhythm.      Pulses: Normal pulses.      Heart sounds: Normal heart sounds. No murmur heard.     No gallop.   Pulmonary:      Effort: Pulmonary effort is normal.      Breath sounds: Normal breath sounds. No wheezing, rhonchi or rales.   Chest:      Chest wall: No tenderness.   Abdominal:      General: There is no distension.      Palpations: Abdomen is soft. There is no mass.      Tenderness: There is no abdominal " tenderness. There is no guarding or rebound.   Musculoskeletal:      Cervical back: Normal range of motion and neck supple. No rigidity or tenderness.      Right lower leg: No edema.      Left lower leg: No edema.   Lymphadenopathy:      Cervical: No cervical adenopathy.   Skin:     Findings: No erythema or rash.   Neurological:      Mental Status: He is alert.   Psychiatric:         Mood and Affect: Mood normal.         Behavior: Behavior normal.         Thought Content: Thought content normal.          Snow Middleton MD  [facility]

## 2024-04-29 NOTE — ASSESSMENT & PLAN NOTE
S/p surgery  healed    walks  with  cane in  care of orthopedic  getting the PT.   Discussed precautions  and  care

## 2024-05-01 ENCOUNTER — OFFICE VISIT (OUTPATIENT)
Dept: PHYSICAL THERAPY | Facility: CLINIC | Age: 69
End: 2024-05-01
Payer: COMMERCIAL

## 2024-05-01 DIAGNOSIS — S82.851A CLOSED TRIMALLEOLAR FRACTURE OF RIGHT ANKLE, INITIAL ENCOUNTER: Primary | ICD-10-CM

## 2024-05-01 PROCEDURE — 97110 THERAPEUTIC EXERCISES: CPT

## 2024-05-01 PROCEDURE — 97112 NEUROMUSCULAR REEDUCATION: CPT

## 2024-05-01 PROCEDURE — 97140 MANUAL THERAPY 1/> REGIONS: CPT

## 2024-05-01 NOTE — PROGRESS NOTES
"Daily Note     Today's date: 2024  Patient name: Dawit Segura  : 1955  MRN: [id number]  Referring provider: Yunior Soto MD  Dx:   Encounter Diagnosis     ICD-10-CM    1. Closed trimalleolar fracture of right ankle, initial encounter  S82.851A                      Subjective: Patient presents with no boot and crutches.       Objective: See treatment diary below      Assessment: Tolerated treatment well. Patient demonstrated fatigue post treatment, exhibited good technique with therapeutic exercises, and would benefit from continued PT. Session focused primarily on restoring ROM today with ROM to tolerance and mobility focused. Patient had good tolerance with improvement in push off post session. He ambulates with one single crutch without CAM boot. Advised that patient should be progressing out of the boot and WBAT. Cueing for toe off needed.       Plan: Continue per plan of care.  Progress treatment as tolerated.         POC Expires Auth Status Start Date Expiration Date PT Visit Limit           Date        Used        Remaining           Diagnosis:  L ankle tri mal ORIF 24   Precautions:  WBAT in boot   Comparable signs 1) Steps  2) Walking   Primary Impairments: 1) Dorsiflexion  2) Strength    Patient Goals Walk normal    Manual Therapy        TCJ mobs SP  SP GIV-GV       Ankle PROM SP SP        Rearfoot mobs  SP GIV        Great toe mobs  SP GIV       Forefoot mobs  SP GIV        Edema  SP         IASTM/EPAT         Re-evaluation          Exercise Diary          Therapeutic Exercise         Bike/TM  5'       Ankle DF mob  20x        Gas/Soleus St 10x10\" ea 30\"x4 ea        Heel Raises                                    Neuromuscular Re-education         Short foot  10x10\"       Toe yoga 10x 10x10\"        SLS          Wobble board         RDL                                     Therapeutic Activities         Education  POC, diagnosis, expecations        SF side steps         Gait " training   2 laps                         Modalities

## 2024-05-02 ENCOUNTER — OFFICE VISIT (OUTPATIENT)
Dept: PHYSICAL THERAPY | Facility: CLINIC | Age: 69
End: 2024-05-02
Payer: COMMERCIAL

## 2024-05-02 DIAGNOSIS — S82.851A CLOSED TRIMALLEOLAR FRACTURE OF RIGHT ANKLE, INITIAL ENCOUNTER: Primary | ICD-10-CM

## 2024-05-02 PROCEDURE — 97140 MANUAL THERAPY 1/> REGIONS: CPT

## 2024-05-02 PROCEDURE — 97112 NEUROMUSCULAR REEDUCATION: CPT

## 2024-05-02 PROCEDURE — 97110 THERAPEUTIC EXERCISES: CPT

## 2024-05-02 NOTE — PROGRESS NOTES
"Daily Note     Today's date: 2024  Patient name: Dawit Segura  : 1955  MRN: [id number]  Referring provider: Mason Connolly DO  Dx:   Encounter Diagnosis     ICD-10-CM    1. Closed trimalleolar fracture of right ankle, initial encounter  S82.851A                      Subjective: Pt reports the swelling has gone down some, he is feeling better.       Objective: See treatment diary below      Assessment: Tolerated treatment well. Pt performed all exercises without issue, continue to progress to tolerance. Pt challenged by AROM exercises, continue to work towards increasing ROM and progression of weight bearing exercises. Pt responded positively to PROM stretching. Patient demonstrated fatigue post treatment, exhibited good technique with therapeutic exercises, and would benefit from continued PT      Plan: Continue per plan of care.          POC Expires Auth Status Start Date Expiration Date PT Visit Limit           Date        Used        Remaining           Diagnosis:  L ankle tri mal ORIF 24   Precautions:  WBAT in boot   Comparable signs 1) Steps  2) Walking   Primary Impairments: 1) Dorsiflexion  2) Strength    Patient Goals Walk normal    Manual Therapy       TCJ mobs SP  SP GIV-GV       Ankle PROM SP SP  KM      Rearfoot mobs  SP GIV  PROM KM      Great toe mobs  SP GIV PROM KM      Forefoot mobs  SP GIV  PROM KM      Edema  SP         IASTM/EPAT         Re-evaluation          Exercise Diary          Therapeutic Exercise         Bike/TM  5' 5'      Ankle DF mob  20x        Gas/Soleus St 10x10\" ea 30\"x4 ea  10x10\"      Heel Raises   Seated 3x10                                 Neuromuscular Re-education         Short foot  10x10\" 10x10\"      Toe yoga 10x 10x10\"  10x10\"      SLS          Wobble board         RDL                                     Therapeutic Activities         Education  POC, diagnosis, expecations        SF side steps         Gait training   2 laps                   "       Modalities

## 2024-05-08 ENCOUNTER — OFFICE VISIT (OUTPATIENT)
Dept: PHYSICAL THERAPY | Facility: CLINIC | Age: 69
End: 2024-05-08
Payer: COMMERCIAL

## 2024-05-08 DIAGNOSIS — S82.851A CLOSED TRIMALLEOLAR FRACTURE OF RIGHT ANKLE, INITIAL ENCOUNTER: Primary | ICD-10-CM

## 2024-05-08 PROCEDURE — 97530 THERAPEUTIC ACTIVITIES: CPT

## 2024-05-08 PROCEDURE — 97110 THERAPEUTIC EXERCISES: CPT

## 2024-05-08 PROCEDURE — 97112 NEUROMUSCULAR REEDUCATION: CPT

## 2024-05-08 NOTE — PROGRESS NOTES
"Daily Note     Today's date: 2024  Patient name: Dawit Segura  : 1955  MRN: [id number]  Referring provider: Mason Connolly DO  Dx:   Encounter Diagnosis     ICD-10-CM    1. Closed trimalleolar fracture of right ankle, initial encounter  S82.851A                      Subjective: Pt reports the swelling has gone down some, he is feeling better.       Objective: See treatment diary below      Assessment: Tolerated treatment well. Continues to need heel toe cueing as patient just ambulates with heel strike. Moderately challenged with ankle mobility exercise. Cueing needed for baps mobility to prevent hip.  Patient demonstrated fatigue post treatment, exhibited good technique with therapeutic exercises, and would benefit from continued PT      Plan: Continue per plan of care.          POC Expires Auth Status Start Date Expiration Date PT Visit Limit           Date        Used        Remaining           Diagnosis:  L ankle tri mal ORIF 24   Precautions:  WBAT in boot   Comparable signs 1) Steps  2) Walking   Primary Impairments: 1) Dorsiflexion  2) Strength    Patient Goals Walk normal    Manual Therapy      TCJ mobs SP  SP GIV-GV  SP GIV-GV     Ankle PROM SP SP  KM SP     Rearfoot mobs  SP GIV  PROM KM SP GV     Great toe mobs  SP GIV PROM KM SP GIV     Forefoot mobs  SP GIV  PROM KM SP GIV     Edema  SP         IASTM/EPAT         Re-evaluation          Exercise Diary          Therapeutic Exercise         Bike/TM  5' 5' 5'     Ankle DF mob  20x        Gas/Soleus St 10x10\" ea 30\"x4 ea  10x10\" 10x10\"     Heel Raises   Seated 3x10 [id number]  Leg press 25# 2x10                                Neuromuscular Re-education         Short foot  10x10\" 10x10\"      Toe yoga 10x 10x10\"  10x10\"      SLS     30\"x3     Wobble board    Baps 20x ea     RDL                                     Therapeutic Activities         Education  POC, diagnosis, expecations        SF side steps         Gait training   " 2 laps                         Modalities

## 2024-05-09 ENCOUNTER — OFFICE VISIT (OUTPATIENT)
Dept: PHYSICAL THERAPY | Facility: CLINIC | Age: 69
End: 2024-05-09
Payer: COMMERCIAL

## 2024-05-09 DIAGNOSIS — S82.851A CLOSED TRIMALLEOLAR FRACTURE OF RIGHT ANKLE, INITIAL ENCOUNTER: Primary | ICD-10-CM

## 2024-05-09 PROCEDURE — 97112 NEUROMUSCULAR REEDUCATION: CPT | Performed by: PHYSICAL THERAPIST

## 2024-05-09 PROCEDURE — 97530 THERAPEUTIC ACTIVITIES: CPT | Performed by: PHYSICAL THERAPIST

## 2024-05-09 PROCEDURE — 97140 MANUAL THERAPY 1/> REGIONS: CPT | Performed by: PHYSICAL THERAPIST

## 2024-05-09 NOTE — PROGRESS NOTES
"Daily Note     Today's date: 2024  Patient name: Dawit Segura  : 1955  MRN: [id number]  Referring provider: Mason Connolly DO  Dx:   Encounter Diagnosis     ICD-10-CM    1. Closed trimalleolar fracture of right ankle, initial encounter  S82.851A           Start Time: 1535  Stop Time: 1615  Total time in clinic (min): 40 minutes    Subjective: Pt reports no new updates.       Objective: See treatment diary below      Assessment: Tolerated treatment well. Continues to demonstrate gait deficits due to impaired motor control and joint restriction. Moderately challenged with ankle mobility exercise. Unable to perform single leg heel raise on leg press at 25#, so regressed to 15# with better form achieved. Continues to lack significant amounts of ankle mobility. Patient demonstrated fatigue post treatment, exhibited good technique with therapeutic exercises, and would benefit from continued PT      Plan: Continue per plan of care.          POC Expires Auth Status Start Date Expiration Date PT Visit Limit           Date        Used        Remaining           Diagnosis:  R ankle tri mal ORIF 24   Precautions:  WBAT in boot   Comparable signs 1) Steps  2) Walking   Primary Impairments: 1) Dorsiflexion  2) Strength    Patient Goals Walk normal    Manual Therapy  5    TCJ mobs SP  SP GIV-GV  SP GIV-GV LB GIV-GV    Ankle PROM SP SP  KM SP LB    Rearfoot mobs  SP GIV  PROM KM SP GV LB GIV    Great toe mobs  SP GIV PROM KM SP GIV LB GIV    Forefoot mobs  SP GIV  PROM KM SP GIV LB GIV    Edema  SP         IASTM/EPAT         Re-evaluation          Exercise Diary          Therapeutic Exercise         Bike/TM  5' 5' 5' 5 min ROM/BF    Ankle DF mob  20x        Gas/Soleus St 10x10\" ea 30\"x4 ea  10x10\" 10x10\" 10x 10\"    Heel Raises   Seated 3x10 [id number]  Leg press 25# 2x10 Seated 3x10    Leg press 15# 2x20                               Neuromuscular Re-education         Short foot  10x10\" 10x10\"   " "   Toe yoga 10x 10x10\"  10x10\"      SLS     30\"x3 30\"x3    Wobble board    Baps 20x ea Baps 20x ea    Balance 1.5'/1.5'    RDL                                     Therapeutic Activities         Education  POC, diagnosis, expecations        SF side steps         Gait training   2 laps                         Modalities                                "

## 2024-05-15 ENCOUNTER — OFFICE VISIT (OUTPATIENT)
Dept: PHYSICAL THERAPY | Facility: CLINIC | Age: 69
End: 2024-05-15
Payer: COMMERCIAL

## 2024-05-15 DIAGNOSIS — S82.851A CLOSED TRIMALLEOLAR FRACTURE OF RIGHT ANKLE, INITIAL ENCOUNTER: Primary | ICD-10-CM

## 2024-05-15 PROCEDURE — 97530 THERAPEUTIC ACTIVITIES: CPT

## 2024-05-15 PROCEDURE — 97140 MANUAL THERAPY 1/> REGIONS: CPT

## 2024-05-15 PROCEDURE — 97112 NEUROMUSCULAR REEDUCATION: CPT

## 2024-05-15 NOTE — PROGRESS NOTES
Daily Note     Today's date: 5/15/2024  Patient name: Dawit Segura  : 1955  MRN: [id number]  Referring provider: Mason Connolly DO  Dx:   Encounter Diagnosis     ICD-10-CM    1. Closed trimalleolar fracture of right ankle, initial encounter  S82.851A           Start Time: 1708  Stop Time: 1748  Total time in clinic (min): 40 minutes    Subjective: Reports that he is doing so-so. No big changes since last visit.       Objective: See treatment diary below      Assessment: Tolerated treatment well. With leg press heel raise, has tendency for compensation with knee extension moment to assist with momentum into the heel raise. Added some soleus raises as well for further calf complex development, required tactile and verbal cuing. BUE support required for wobble board. Some lateral stepping for frontal plane ankle stability, dynamic balance development. Overall patient did well with only increase in pain during soleus stretch likelly due to limited dorsiflexion joint mobility.  Patient demonstrated fatigue post treatment, exhibited good technique with therapeutic exercises, and would benefit from continued PT      Plan: Continue per plan of care.  Progress treatment as tolerated.         POC Expires Auth Status Start Date Expiration Date PT Visit Limit           Date        Used        Remaining           Diagnosis:  R ankle tri mal ORIF 24   Precautions:  WBAT in boot   Comparable signs 1) Steps  2) Walking   Primary Impairments: 1) Dorsiflexion  2) Strength    Patient Goals Walk normal    Manual Therapy  5/2 5/8 5/9 5/15    TCJ mobs SP  SP GIV-GV  SP GIV-GV LB GIV-GV MH GIV-GV   Ankle PROM SP SP  KM SP LB MH    Rearfoot mobs  SP GIV  PROM KM SP GV LB GIV MH GIV   Great toe mobs  SP GIV PROM KM SP GIV LB GIV MH GIV   Forefoot mobs  SP GIV  PROM KM SP GIV LB GIV MH GIV   Edema  SP         IASTM/EPAT         Re-evaluation          Exercise Diary          Therapeutic Exercise         Bike/TM  5' 5'  "5' 5 min ROM/BF 5 min ROM/BF   Ankle DF mob  20x        Gas/Soleus St 10x10\" ea 30\"x4 ea  10x10\" 10x10\" 10x 10\" 10x10\" ea stand   Heel Raises   Seated 3x10 [id number]  Leg press 25# 2x10 Seated 3x10    Leg press 15# 2x20 Seated 3x10     Leg press 20# 2x20 (knee bent and extended)                               Neuromuscular Re-education         Short foot  10x10\" 10x10\"      Toe yoga 10x 10x10\"  10x10\"      SLS     30\"x3 30\"x3    Wobble board    Baps 20x ea Baps 20x ea    Balance 1.5'/1.5' Balance 1.5'/1.5'   RDL          Side stepping       3x laps                      Therapeutic Activities         Education  POC, diagnosis, expecations        SF side steps         Gait training   2 laps                         Modalities                                  "

## 2024-05-16 ENCOUNTER — OFFICE VISIT (OUTPATIENT)
Dept: PHYSICAL THERAPY | Facility: CLINIC | Age: 69
End: 2024-05-16
Payer: COMMERCIAL

## 2024-05-16 DIAGNOSIS — S82.851A CLOSED TRIMALLEOLAR FRACTURE OF RIGHT ANKLE, INITIAL ENCOUNTER: Primary | ICD-10-CM

## 2024-05-16 PROCEDURE — 97112 NEUROMUSCULAR REEDUCATION: CPT

## 2024-05-16 PROCEDURE — 97110 THERAPEUTIC EXERCISES: CPT

## 2024-05-16 PROCEDURE — 97140 MANUAL THERAPY 1/> REGIONS: CPT

## 2024-05-16 NOTE — PROGRESS NOTES
"Daily Note     Today's date: 2024  Patient name: Dawit Segura  : 1955  MRN: [id number]  Referring provider: Mason Connolly DO  Dx:   Encounter Diagnosis     ICD-10-CM    1. Closed trimalleolar fracture of right ankle, initial encounter  S82.851A                      Subjective: Reports that he is doing so-so. No big changes since last visit.       Objective: See treatment diary below      Assessment: Tolerated treatment well. Unable to perform heel raise in standing without compensation in knee extension an hip thrust. Patient challenged with taps secondary to weakness in plantar flexors. Patient demonstrated fatigue post treatment, exhibited good technique with therapeutic exercises, and would benefit from continued PT      Plan: Continue per plan of care.  Progress treatment as tolerated.         POC Expires Auth Status Start Date Expiration Date PT Visit Limit           Date        Used        Remaining           Diagnosis:  R ankle tri mal ORIF 24   Precautions:  WBAT in boot   Comparable signs 1) Steps  2) Walking   Primary Impairments: 1) Dorsiflexion  2) Strength    Patient Goals Walk normal    Manual Therapy 5/16 5/1 5/2 5/8 5/9 5/15    TCJ mobs SP GIV-GV SP GIV-GV  SP GIV-GV LB GIV-GV MH GIV-GV   Ankle PROM SP SP  KM SP LB MH    Rearfoot mobs SP GIV  SP GIV  PROM KM SP GV LB GIV MH GIV   Great toe mobs SP GIV SP GIV PROM KM SP GIV LB GIV MH GIV   Forefoot mobs SP GIV SP GIV  PROM KM SP GIV LB GIV MH GIV   Edema          IASTM/EPAT         Re-evaluation          Exercise Diary          Therapeutic Exercise         Bike/TM 5' min ROM/BF 5' 5' 5' 5 min ROM/BF 5 min ROM/BF   Ankle DF mob  20x        Gas/Soleus St 10x10\" ea 30\"x4 ea  10x10\" 10x10\" 10x 10\" 10x10\" ea stand   Heel Raises Leg press 25# 2x20  Seated 3x10 [id number]  Leg press 25# 2x10 Seated 3x10    Leg press 15# 2x20 Seated 3x10     Leg press 20# 2x20 (knee bent and extended)                               Neuromuscular " "Re-education         Short foot  10x10\" 10x10\"      Toe yoga 10x 10x10\"  10x10\"      SLS  30\"x3   30\"x3 30\"x3    Wobble board Balance 1.5'/1.5  Taps 20x    Baps 20x ea Baps 20x ea    Balance 1.5'/1.5' Balance 1.5'/1.5'   RDL          Side stepping  Xwalks RTB 2 laps     3x laps                      Therapeutic Activities         Education          SF side steps         Gait training   2 laps                         Modalities                                  "

## 2024-05-20 ENCOUNTER — TELEPHONE (OUTPATIENT)
Dept: GASTROENTEROLOGY | Facility: CLINIC | Age: 69
End: 2024-05-20

## 2024-05-20 NOTE — TELEPHONE ENCOUNTER
Pt is due for a 5 yr colon recall -  Family hist of colon cancer, brother / hx of polyps with Dr Guerra. Called and spoke to wife whom informed pt was not home, however, she will give him message to call us back to schedule.  Will call again if do not hear back from pt.

## 2024-05-22 ENCOUNTER — OFFICE VISIT (OUTPATIENT)
Dept: PHYSICAL THERAPY | Facility: CLINIC | Age: 69
End: 2024-05-22
Payer: COMMERCIAL

## 2024-05-22 DIAGNOSIS — S82.851A CLOSED TRIMALLEOLAR FRACTURE OF RIGHT ANKLE, INITIAL ENCOUNTER: Primary | ICD-10-CM

## 2024-05-22 PROCEDURE — 97110 THERAPEUTIC EXERCISES: CPT | Performed by: PHYSICAL THERAPIST

## 2024-05-22 PROCEDURE — 97140 MANUAL THERAPY 1/> REGIONS: CPT | Performed by: PHYSICAL THERAPIST

## 2024-05-22 PROCEDURE — 97112 NEUROMUSCULAR REEDUCATION: CPT | Performed by: PHYSICAL THERAPIST

## 2024-05-22 NOTE — PROGRESS NOTES
"Daily Note     Today's date: 2024  Patient name: Dawit Segura  : 1955  MRN: [id number]  Referring provider: Mason Connolly DO  Dx:   Encounter Diagnosis     ICD-10-CM    1. Closed trimalleolar fracture of right ankle, initial encounter  S82.851A                      Subjective: patient reports he is doing well, able to walk a little better each day.      Objective: See treatment diary below      Assessment: Tolerated treatment well. Continued with program as noted below. Patient demonstrated fatigue post treatment, exhibited good technique with therapeutic exercises, and would benefit from continued PT      Plan: Continue per plan of care.  Progress treatment as tolerated.         POC Expires Auth Status Start Date Expiration Date PT Visit Limit           Date        Used        Remaining           Diagnosis:  R ankle tri mal ORIF 24   Precautions:  WBAT in boot   Comparable signs 1) Steps  2) Walking   Primary Impairments: 1) Dorsiflexion  2) Strength    Patient Goals Walk normal    Manual Therapy 5/16 5/22  5/8 5/9 5/15    TCJ mobs SP GIV-GV SK GIV-GV  SP GIV-GV LB GIV-GV MH GIV-GV   Ankle PROM SP SK  SP LB MH    Rearfoot mobs SP GIV  SK GIV  SP GV LB GIV MH GIV   Great toe mobs SP GIV SK GIV  SP GIV LB GIV MH GIV   Forefoot mobs SP GIV SK GIV  SP GIV LB GIV MH GIV   Edema          IASTM/EPAT         Re-evaluation          Exercise Diary          Therapeutic Exercise         Bike/TM 5' min ROM/BF 5' min ROM/BF  5' 5 min ROM/BF 5 min ROM/BF   Ankle DF mob         Gas/Soleus St 10x10\" ea 10\"x10 ea  10x10\" 10x 10\" 10x10\" ea stand   Heel Raises Leg press 25# 2x20 Leg press 25# 2x20   [id number]  Leg press 25# 2x10 Seated 3x10    Leg press 15# 2x20 Seated 3x10     Leg press 20# 2x20 (knee bent and extended)                               Neuromuscular Re-education         Short foot         Toe yoga 10x 10x       SLS  30\"x3 30\"x3  30\"x3 30\"x3    Wobble board Balance 1.5'/1.5  Taps 20x  Balance " 1.5'/1.5  Taps 20x   Baps 20x ea Baps 20x ea    Balance 1.5'/1.5' Balance 1.5'/1.5'   RDL          Side stepping  Xwalks RTB 2 laps Xwalks RTB 2 laps    3x laps                      Therapeutic Activities         Education          SF side steps         Gait training                            Modalities

## 2024-05-23 ENCOUNTER — OFFICE VISIT (OUTPATIENT)
Dept: PHYSICAL THERAPY | Facility: CLINIC | Age: 69
End: 2024-05-23
Payer: COMMERCIAL

## 2024-05-23 DIAGNOSIS — S82.851A CLOSED TRIMALLEOLAR FRACTURE OF RIGHT ANKLE, INITIAL ENCOUNTER: Primary | ICD-10-CM

## 2024-05-23 PROCEDURE — 97140 MANUAL THERAPY 1/> REGIONS: CPT

## 2024-05-23 NOTE — PROGRESS NOTES
"Daily Note     Today's date: 2024  Patient name: Dawit Segura  : 1955  MRN: [id number]  Referring provider: Mason Connolly DO  Dx:   Encounter Diagnosis     ICD-10-CM    1. Closed trimalleolar fracture of right ankle, initial encounter  S82.851A                      Subjective: patient reports he is doing well, able to walk a little better each day.      Objective: See treatment diary below      Assessment: Tolerated treatment well. Continued with program as noted below. Still challenged with push off due to ankle mobility and ankle strength. Unable to do a full heel raise against gravity. Patient demonstrated fatigue post treatment, exhibited good technique with therapeutic exercises, and would benefit from continued PT      Plan: Continue per plan of care.  Progress treatment as tolerated.         POC Expires Auth Status Start Date Expiration Date PT Visit Limit           Date        Used        Remaining           Diagnosis:  R ankle tri mal ORIF 24   Precautions:  WBAT in boot   Comparable signs 1) Steps  2) Walking   Primary Impairments: 1) Dorsiflexion  2) Strength    Patient Goals Walk normal    Manual Therapy 5/16 5/22 5/23 5/8 5/9 5/15    TCJ mobs SP GIV-GV SK GIV-GV SP GIV-GIV SP GIV-GV LB GIV-GV MH GIV-GV   Ankle PROM SP SK SP  SP LB MH    Rearfoot mobs SP GIV  SK GIV SP GIV  SP GV LB GIV MH GIV   Great toe mobs SP GIV SK GIV SP GIV SP GIV LB GIV MH GIV   Forefoot mobs SP GIV SK GIV SP GIV SP GIV LB GIV MH GIV   Edema          IASTM/EPAT         Re-evaluation          Exercise Diary          Therapeutic Exercise         Bike/TM 5' min ROM/BF 5' min ROM/BF 5' min ROM 5' 5 min ROM/BF 5 min ROM/BF   Ankle DF mob   10x10\"      Gas/Soleus St 10x10\" ea 10\"x10 ea 10x10\" 10x10\" 10x 10\" 10x10\" ea stand   Heel Raises Leg press 25# 2x20 Leg press 25# 2x20  Seated 2x15 10#     Incline 2x15  [id number]  Leg press 25# 2x10 Seated 3x10    Leg press 15# 2x20 Seated 3x10     Leg press 20# 2x20 (knee " "bent and extended)                               Neuromuscular Re-education         Short foot         Toe yoga 10x 10x       SLS  30\"x3 30\"x3 30\"x3 foam 30\"x3 30\"x3    Wobble board Balance 1.5'/1.5  Taps 20x  Balance 1.5'/1.5  Taps 20x  Balance 2' ea  Taps SL 20x Baps 20x ea Baps 20x ea    Balance 1.5'/1.5' Balance 1.5'/1.5'   RDL          Side stepping  Xwalks RTB 2 laps Xwalks RTB 2 laps    3x laps                      Therapeutic Activities         Education          SF side steps         Gait training          Step ups on bosu ball    2x10 R                Modalities                                    "

## 2024-05-28 ENCOUNTER — OFFICE VISIT (OUTPATIENT)
Dept: PHYSICAL THERAPY | Facility: CLINIC | Age: 69
End: 2024-05-28
Payer: COMMERCIAL

## 2024-05-28 ENCOUNTER — APPOINTMENT (OUTPATIENT)
Dept: RADIOLOGY | Facility: AMBULARY SURGERY CENTER | Age: 69
End: 2024-05-28
Attending: STUDENT IN AN ORGANIZED HEALTH CARE EDUCATION/TRAINING PROGRAM
Payer: COMMERCIAL

## 2024-05-28 ENCOUNTER — OFFICE VISIT (OUTPATIENT)
Dept: OBGYN CLINIC | Facility: CLINIC | Age: 69
End: 2024-05-28
Payer: COMMERCIAL

## 2024-05-28 ENCOUNTER — TELEPHONE (OUTPATIENT)
Age: 69
End: 2024-05-28

## 2024-05-28 VITALS — WEIGHT: 202.2 LBS | HEIGHT: 69 IN | BODY MASS INDEX: 29.95 KG/M2

## 2024-05-28 DIAGNOSIS — S82.851A CLOSED TRIMALLEOLAR FRACTURE OF RIGHT ANKLE, INITIAL ENCOUNTER: Primary | ICD-10-CM

## 2024-05-28 DIAGNOSIS — S82.851A CLOSED TRIMALLEOLAR FRACTURE OF RIGHT ANKLE, INITIAL ENCOUNTER: ICD-10-CM

## 2024-05-28 PROCEDURE — 99213 OFFICE O/P EST LOW 20 MIN: CPT | Performed by: STUDENT IN AN ORGANIZED HEALTH CARE EDUCATION/TRAINING PROGRAM

## 2024-05-28 PROCEDURE — 97112 NEUROMUSCULAR REEDUCATION: CPT

## 2024-05-28 PROCEDURE — 97140 MANUAL THERAPY 1/> REGIONS: CPT

## 2024-05-28 PROCEDURE — 73610 X-RAY EXAM OF ANKLE: CPT

## 2024-05-28 NOTE — TELEPHONE ENCOUNTER
Caller: Patient/Savi spouse    Doctor: Cathleen    Reason for call: Request work status faxed to [employer]#[id number] doi 2/18/24 tel#[phone] fax#[phone]  faxed  Call back#: [phone]

## 2024-05-28 NOTE — PROGRESS NOTES
"Daily Note     Today's date: 2024  Patient name: Dawit Segura  : 1955  MRN: [id number]  Referring provider: Mason Connloly DO  Dx:   Encounter Diagnosis     ICD-10-CM    1. Closed trimalleolar fracture of right ankle, initial encounter  S82.851A                      Subjective: patient reports he is doing well, he still has some mild pain      Objective: See treatment diary below      Assessment: Tolerated treatment well. Continued with program as noted below, continues to be challenged with full ROM against gravity. Improved in incline position. Patient demonstrated fatigue post treatment, exhibited good technique with therapeutic exercises, and would benefit from continued PT      Plan: Continue per plan of care.  Progress treatment as tolerated.         POC Expires Auth Status Start Date Expiration Date PT Visit Limit           Date        Used        Remaining           Diagnosis:  R ankle tri mal ORIF 24   Precautions:  WBAT in boot   Comparable signs 1) Steps  2) Walking   Primary Impairments: 1) Dorsiflexion  2) Strength    Patient Goals Walk normal    Manual Therapy 5/16 5/22 5/23 5/28 5/9 5/15    TCJ mobs SP GIV-GV SK GIV-GV SP GIV-GIV SP GIV-GV LB GIV-GV MH GIV-GV   Ankle PROM SP SK SP  SP LB MH    Rearfoot mobs SP GIV  SK GIV SP GIV  SP GV LB GIV MH GIV   Great toe mobs SP GIV SK GIV SP GIV SP GIV LB GIV MH GIV   Forefoot mobs SP GIV SK GIV SP GIV SP GIV LB GIV MH GIV   Edema          IASTM/EPAT         Re-evaluation          Exercise Diary          Therapeutic Exercise         Bike/TM 5' min ROM/BF 5' min ROM/BF 5' min ROM 5' 5 min ROM/BF 5 min ROM/BF   Ankle DF mob   10x10\" 10x10\"     Gas/Soleus St 10x10\" ea 10\"x10 ea 10x10\" 10x10\" off step  10x 10\" 10x10\" ea stand   Heel Raises Leg press 25# 2x20 Leg press 25# 2x20  Seated 2x15 10#     Incline 2x15  [id number] 10# Seated 3x10    Leg press 15# 2x20 Seated 3x10     Leg press 20# 2x20 (knee bent and extended)                            " "   Neuromuscular Re-education         Short foot         Toe yoga 10x 10x       SLS  30\"x3 30\"x3 30\"x3 foam 30\"x3 30\"x3    Wobble board Balance 1.5'/1.5  Taps 20x  Balance 1.5'/1.5  Taps 20x  Balance 2' ea  Taps SL 20x Baps 20x ea Baps 20x ea    Balance 1.5'/1.5' Balance 1.5'/1.5'   RDL          Side stepping  Xwalks RTB 2 laps Xwalks RTB 2 laps    3x laps    Rebounder    SL red 2x15              Therapeutic Activities         Education          SF side steps         Gait training          Step ups on bosu ball    2x10 R  2x10 R              Modalities                                    "

## 2024-05-28 NOTE — LETTER
May 28, 2024     Patient: Dawit Segura  YOB: 1955  Date of Visit: 5/28/2024      To Whom it May Concern:    Dawit Segura is under my professional care. Dawit was seen in my office on 5/28/2024. Dawit is recommended to stay out of work for six more weeks (6/30/2024) until his next appointment    If you have any questions or concerns, please don't hesitate to call.         Sincerely,          Mason Connolly DO        CC: No Recipients

## 2024-05-28 NOTE — PROGRESS NOTES
Orthopaedics Office Visit -new patient Visit    ASSESSMENT/PLAN:    Assessment:   Right closed trimalleolar fracture, DOI: 2/10/2024  S/P ORIF right ankle 2/20/24    Plan:   X-rays reviewed and discussed with patient revealing maintained alignment of pt's previous trimalleolar ankle fracture with no signs of hardware failure or loosening.  There is healing noted   The patient has began physical therapy and he was recommended to continue physical therapy.   Continue weightbearing as tolerated to the right lower extremity  ROMAT right ankle  Work note given for 3 months  Pt to start PT for ROM and strengthening exercises of the ankle, new referral sent.  Stressed the importance of physical therapy to restore range of motion to prevent long-term disability  Pt to continue at home analgesic regimen with Tylenol and ibuprofen for pain control.  Pt to follow up in 6 weeks for repeat x-ray and re-evaluation  Work note given for six more weeks off work       _____________________________________________________  CHIEF COMPLAINT:  Chief Complaint   Patient presents with    Right Ankle - Post-op         SUBJECTIVE:  Dawit Segura is a 69 y.o. male who presents 5 days status post right trimalleolar ankle fracture. He has been compliant with his nonweightbearing status.  Patient reports tripping down 3 stairs while carrying laundry down at his house and landing on his left ankle.  He states he felt immediate pain and swelling after the incident.  He states most of his pain is concentrated diffusely across the right ankle.  He states the pain is exacerbated with any attempts at movement and alleviated with rest.  He has been taking advil for pain control which provides him with adequate relief. He denies any previous trauma or surgery to the ankle. He denies any numbness or paresthesias in the right lower extremity.    Interval history 3/4/2024  Presents approximately 2 weeks status post ORIF right ankle from a closed right  trimalleolar fracture.  He has been compliant with his nonweightbearing status and presents today in a wheelchair.  He endorses mild pain diffusely around the right ankle and swelling, especially over the lateral aspect of the ankle.  He states his pain today is a 3/10. He states he has not started PT yet.  He denies any numbness tingling or paresthesias in the right lower extremity.  Denies fevers or chills    Interval history 4/15/2024  Presents approximately 8 weeks status post ORIF right ankle from a closed right trimalleolar fracture.  He has not been compliant with his nonweightbearing status, he states he walks around the house without the boot and puts weight on the foot. He stopped using the boot several weeks ago. He also has not been compliant with dvt ppx. He stopped taking asa 4 weeks ago. He has not started PT yet, he states he was waiting until this appointment.  It was prescribed at his last appointment. he endorses mild pain over the lateral ankle.  He denies any numbness tingling or paresthesias in the right lower extremity.  Denies fevers or chills    Interval History 4/28/2024  Presents approximately 3 months after his right fracture fixation.  He reports he has been seeing physical therapy for range of motion exercises and is going well.  He reports he has been seeing them for roughly a month.  He reports moderate soreness at the anterior ankle that is worse when he wears his sandals in the house.  He denies new injury he denies numbness and tingling.  He has continued to walk on it without a boot.  He would like 6 weeks off of work due to the pain that he experiences while ambulating.    PAST MEDICAL HISTORY:  Past Medical History:   Diagnosis Date    Hyperlipidemia     Hypertension     Sleep apnea     NO CPAP    Tremors of nervous system     per [place]    Vitamin D deficiency disease        PAST SURGICAL HISTORY:  Past Surgical History:   Procedure Laterality Date    COLONOSCOPY      UT OPEN  "TX TRIMALLEOLAR ANKLE FX W/O FIXJ PST LIP Right 2/20/2024    Procedure: OPEN REDUCTION W/ INTERNAL FIXATION (ORIF) ANKLE;  Surgeon: Mason Connolly DO;  Location: [facility];  Service: Orthopedics       FAMILY HISTORY:  Family History   Problem Relation Age of Onset    Colon cancer Brother     Stroke Brother     Heart disease Brother         pacemaker in situ       SOCIAL HISTORY:  Social History     Tobacco Use    Smoking status: Never    Smokeless tobacco: Never   Vaping Use    Vaping status: Never Used   Substance Use Topics    Alcohol use: Not Currently     Comment: No use     Drug use: Never     Comment: No use       MEDICATIONS:    Current Outpatient Medications:     EPINEPHrine (EPIPEN) 0.3 mg/0.3 mL SOAJ, Inject 0.3 mL (0.3 mg total) into a muscle once as needed for anaphylaxis (severe allergic reaction) for up to 1 dose After using, call 911 and come to hospital right away., Disp: 0.6 mL, Rfl: 0    ibuprofen (MOTRIN) 600 mg tablet, Take 1 tablet (600 mg total) by mouth every 6 (six) hours as needed for mild pain, Disp: 20 tablet, Rfl: 0    losartan (COZAAR) 100 MG tablet, TAKE 1 TABLET BY MOUTH EVERY DAY (Patient taking differently: Take by mouth daily after lunch), Disp: 90 tablet, Rfl: 3    montelukast (SINGULAIR) 10 mg tablet, Take 1 tablet (10 mg total) by mouth daily at bedtime, Disp: 90 tablet, Rfl: 0    aspirin 325 mg tablet, Take 1 tablet (325 mg total) by mouth 2 (two) times a day (Patient not taking: Reported on 4/29/2024), Disp: 84 tablet, Rfl: 0    ALLERGIES:  Allergies   Allergen Reactions    Sesame Seed (Diagnostic) - Food Allergy Anaphylaxis       REVIEW OF SYSTEMS:  MSK: Left ankle pain  Neuro: None  Pertinent items are otherwise noted in HPI.  A comprehensive review of systems was otherwise negative.    LABS:  HgA1c: No results found for: \"HGBA1C\"  BMP:   Lab Results   Component Value Date    CALCIUM 9.1 02/15/2024    K 4.3 02/15/2024    CO2 29 02/15/2024     02/15/2024    BUN 20 " "02/15/2024    CREATININE 0.77 02/15/2024     CBC: No components found for: \"CBC\"    _____________________________________________________  PHYSICAL EXAMINATION:  Vital signs: Ht 5' 9\" (1.753 m)   Wt 91.7 kg (202 lb 3.2 oz)   BMI 29.86 kg/m²   General: No acute distress, awake and alert  Psychiatric: Mood and affect appear appropriate  HEENT: Trachea Midline, No torticollis, no apparent facial trauma  Cardiovascular: No audible murmurs; Extremities appear perfused  Pulmonary: No audible wheezing or stridor  Skin: No open lesions; see further details (if any) below    MUSCULOSKELETAL EXAMINATION:  Extremities: Right lower extremity    The right lower extremity was exposed and inspected.  Surgical incisions are healed without erythema, drainage or signs of dehiscence. All other visible skin intact without erythema, ecchymosis, effusion or obvious osseous deformity.  He has mild tenderness over the medial malleolus and anterior ankle.  He is NTTP over the lateral malleolus. Pt able to range ankle from 30 degrees of plantarflexion and 7 degrees of dorsiflexion. Sensation intact to superficial peroneal, deep peroneal, sural, saphenous, plantar nerve distributions. Motor intact to extensor hallux longus, tibialis anterior, gastrocnemius muscles, extensor mechanism intact. Limb is well perfused. Brisk capillary refill in all 5 digits. Compartments soft and compressible.       _____________________________________________________  STUDIES REVIEWED:  I personally reviewed the images and interpretation is as follows:  X-rays right ankle reveal: Maintained alignment of patient's previous right trimalleolar ankle fracture.  No signs of hardware failure or loosening.  There is healing seen. Ankle mortise well-maintained.  Syndesmotic relationships well-maintained. Interval healing noted    PROCEDURES PERFORMED:  Procedures none     Ross Roberts MD   "

## 2024-05-29 ENCOUNTER — APPOINTMENT (OUTPATIENT)
Dept: PHYSICAL THERAPY | Facility: CLINIC | Age: 69
End: 2024-05-29
Payer: COMMERCIAL

## 2024-05-29 PROBLEM — Z00.00 ROUTINE MEDICAL EXAM: Status: RESOLVED | Noted: 2024-04-29 | Resolved: 2024-05-29

## 2024-05-30 ENCOUNTER — EVALUATION (OUTPATIENT)
Dept: PHYSICAL THERAPY | Facility: CLINIC | Age: 69
End: 2024-05-30
Payer: COMMERCIAL

## 2024-05-30 DIAGNOSIS — S82.851A CLOSED TRIMALLEOLAR FRACTURE OF RIGHT ANKLE, INITIAL ENCOUNTER: Primary | ICD-10-CM

## 2024-05-30 PROCEDURE — 97140 MANUAL THERAPY 1/> REGIONS: CPT

## 2024-05-30 PROCEDURE — 97112 NEUROMUSCULAR REEDUCATION: CPT

## 2024-05-30 PROCEDURE — 97110 THERAPEUTIC EXERCISES: CPT

## 2024-05-30 NOTE — PROGRESS NOTES
PT Re-Evaluation     Today's date: 2024  Patient name: Dawit Segura  : 1955  MRN: [id number]  Referring provider: Mason Connolly DO  Dx:   Encounter Diagnosis     ICD-10-CM    1. Closed trimalleolar fracture of right ankle, initial encounter  S82.851A                        Assessment  Impairments: abnormal coordination, abnormal gait, abnormal muscle firing, abnormal or restricted ROM, abnormal movement, activity intolerance, impaired balance, impaired physical strength, lacks appropriate home exercise program, pain with function, safety issue, weight-bearing intolerance and poor body mechanics    Assessment details:   RE 24 Dawit presents to re-evaluation today with slight improvements in strength and ROM. He is still challenged with heel raises, specifically gastroc strength and gait mechanics due to limited dorsiflexion and strength. Patient will continue to benefit from skilled PT at this time to improve strength ROM and overall function and continue to address remaining deficits.     IE:Dawit Segura is a pleasant 69 y.o. male presents with signs and symptoms consistent with:   Closed trimalleolar fracture of right ankle, initial encounter    Problem List:  1) Dorsiflexion  2) Strength    Comparable signs:  1) Walking  2) Stairs    he has hypomobility with dorsiflexion, rear foot mobility, decreased ankle strength, and impaired gait resulting in worry over not knowing what's wrong and fear of not being able to keep active. These impairments listed above are preventing the patient from participating in functional activity. No further referral appears necessary at this time based upon examination results, and is negative for any red flags. Prognosis is good based off HEP compliance and attendance to physical therapy 2x a week.  Positive prognostic indicators include positive attitude toward recovery and good understanding of diagnosis and treatment plan options.  Negative prognostic  indicators include chronicity of symptoms and hypertension.  Patent will benefit from skilled physical therapy at this time to address deficits to improve overall function and return to PLOF. Patient verbalized understanding of POC, HEP, and return demonstrated HEP. All questions were answered to patients satisfaction.     Please contact me if you have any questions or recommendations. Thank you for the referral and the opportunity to share in Dawit Segura's care.              Understanding of Dx/Px/POC: good     Prognosis: good    Goals  Impairment Goals 4-6 weeks Progressing 5/30/24  In order to maximize function patient will be able to...   - Decrease intensity/duration/frequency of pain to 2/10  - Demonstrate 20 degrees of Dorsiflexion for normal gait  - Demonstrate 60 degrees of great toe extension for normal gait  - Increase hip strength to 5/5 throughout  - Increase ankle strength to 5/5 throughout   - Demonstrate a SLS on uneven and even surfaces for 30 seconds without compensations     Functional Goals 6-8 weeks Progressing 5/30/24  In order to return to prior level of function patient will be able to...   - Participate in ADL's/IADL's/sport specific activities with no greater than 2/10 pain.    - Increase Functional Status Measure (FOTO) to predicted outcome scores  - Demonstrate independence and compliant with HEP  - Demonstrate a squat and or sit to stand with good mechanics and eccentric control without pain/difficulty/compensation  - Ascend and descend stairs without increased pain/compensation/difficulty and a reciprocal gait pattern.  - Patient will be able to demonstrate good gait mechanics without compensations.   - Demonstrate ankle stability on even and uneven surfaces as seen by minimal to no compensations or LOB in order to progress patient to running and higher level activities        Plan  Patient would benefit from: skilled PT  Planned modality interventions: cryotherapy, electrical  stimulation/[place] stimulation, TENS and thermotherapy: hydrocollator packs    Planned therapy interventions: joint mobilization, manual therapy, neuromuscular re-education, patient education, strengthening, stretching, therapeutic activities, therapeutic exercise, home exercise program, functional ROM exercises, Adkins taping, postural training, gait training, balance, balance/weight bearing training, flexibility, graded exercise and motor coordination training    Frequency: 2x week  Duration in weeks: 12  Treatment plan discussed with: patient        Subjective Evaluation    History of Present Illness  Mechanism of injury: RE 24 Patient presents to PT s/p R trimal ORIF on 24. He notes that he still has some pain with walking and difficulty with gait. He notes improvement with ROM, despite still feeling stiff. He is currently walking in a sneaker.  He notes 50% improvement. He is still challenged with walking slow    IE:Patient presents to PT s/p R tri maleolar ORIF on  with Dr. Collier. He fell down the stairs and broke his ankle. Patient is currently in a walking boot, and sometimes will put weight on it. He reports some pain with walking, and stairs. He is currently a  but is not working. As of last MD visit he is cleared for WB and ROM. He is no longer taking any pain medication. He is not cleared for driving.     Difficulty with: up and down stairs, walking long distances.   Patient Goals  Patient goals for therapy: decreased pain, independence with ADLs/IADLs and return to work  Patient goal: able to do every day tasks (making progress toward it)  Pain  Current pain ratin  At best pain ratin  At worst pain ratin  Location: right round the ankle  Quality: dull ache, tight and pressure  Aggravating factors: stair climbing, walking and standing      Diagnostic Tests  Abnormal x-ray: see chart for xray.  Treatments  No previous or current  "treatments        Objective     Active Range of Motion     Right Ankle/Foot   Dorsiflexion (ke): 10 degrees   Dorsiflexion (kf): 0 degrees   Plantar flexion: 46 degrees   Inversion: 20 degrees   Eversion: 0 degrees   Great toe extension: 80 degrees     Passive Range of Motion     Right Ankle/Foot    Dorsiflexion (ke): 15 degrees     Strength/Myotome Testing     Right Ankle/Foot   Great toe flexion: 4  Great toe extension: 4    Additional Strength Details  PF not tested  Right          Fib Brevis 4/5  Fib Longus 4/5  Ant Tib 4/5  Post Tib 4/5    Left:  Fib Brevis 5/5  Fib Longus 5/5  Ant Tib 5/5  Post Tib 5/5      Swelling   Left Ankle/Foot   Metatarsal heads: 21 cm  Figure 8: 52 cm  [place]: 21 cm    Right Ankle/Foot   Metatarsal heads: 21 cm  Figure 8: 54 cm  [place]: 26 cm    General Comments:      Ankle/Foot Comments   SLS R: 11s 30 L   SLS R: 4s foam 30s L               POC Expires Auth Status Start Date Expiration Date PT Visit Limit           Date        Used        Remaining           Diagnosis:  R ankle tri mal ORIF 2/20/24   Precautions:  WBAT in boot   Comparable signs 1) Steps  2) Walking   Primary Impairments: 1) Dorsiflexion  2) Strength    Patient Goals Walk normal    Manual Therapy 5/16 5/22 5/23 5/28 5/30    TCJ mobs SP GIV-GV SK GIV-GV SP GIV-GIV SP GIV-GV SP GIV-GV    Ankle PROM SP SK SP  SP SP     Rearfoot mobs SP GIV  SK GIV SP GIV  SP GV SP GIV    Great toe mobs SP GIV SK GIV SP GIV SP GIV SP GIV    Forefoot mobs SP GIV SK GIV SP GIV SP GIV SP GIV    Edema          IASTM/EPAT         Re-evaluation      SP    Exercise Diary          Therapeutic Exercise         Bike/TM 5' min ROM/BF 5' min ROM/BF 5' min ROM 5' 5 min ROM/BF    Ankle DF mob   10x10\" 10x10\"     Gas/Soleus St 10x10\" ea 10\"x10 ea 10x10\" 10x10\" off step      Heel Raises Leg press 25# 2x20 Leg press 25# 2x20  Seated 2x15 10#     Incline 2x15  [id number] 10# Seated 3x15 10                               Neuromuscular Re-education       " "  Short foot         Toe yoga 10x 10x       SLS  30\"x3 30\"x3 30\"x3 foam 30\"x3 30\"x3 foam    Wobble board Balance 1.5'/1.5  Taps 20x  Balance 1.5'/1.5  Taps 20x  Balance 2' ea  Taps SL 20x Baps 20x ea Baps 20x ea    Balance 1.5'/1.5'    RDL          Side stepping  Xwalks RTB 2 laps Xwalks RTB 2 laps       Rebounder    SL red 2x15 SL red 2x15             Therapeutic Activities         Education          SF side steps         Gait training          Step ups on bosu ball    2x10 R  2x10 R              Modalities                         "

## 2024-06-03 ENCOUNTER — OFFICE VISIT (OUTPATIENT)
Dept: PHYSICAL THERAPY | Facility: CLINIC | Age: 69
End: 2024-06-03
Payer: COMMERCIAL

## 2024-06-03 DIAGNOSIS — S82.851A CLOSED TRIMALLEOLAR FRACTURE OF RIGHT ANKLE, INITIAL ENCOUNTER: Primary | ICD-10-CM

## 2024-06-03 PROCEDURE — 97112 NEUROMUSCULAR REEDUCATION: CPT

## 2024-06-03 PROCEDURE — 97110 THERAPEUTIC EXERCISES: CPT

## 2024-06-03 PROCEDURE — 97140 MANUAL THERAPY 1/> REGIONS: CPT

## 2024-06-03 NOTE — PROGRESS NOTES
"  Daily Note     Today's date: 6/3/2024  Patient name: Dawit Segura  : 1955  MRN: [id number]  Referring provider: Mason Connolly DO  Dx:   Encounter Diagnosis     ICD-10-CM    1. Closed trimalleolar fracture of right ankle, initial encounter  S82.851A         Subjective: Reports 5/10 pain upon arrival. Also c/o ankle stiffness.      Objective: See treatment diary below      Assessment: Challenged with ankle stability and strengthening with higher level strengthening and proprioceptive training. Encouraged minimal use of UE support as able. Most exercises performed without shoe to promote increased challenge. Noted weakness especially with gastroc strengthening. No increased pain reported throughout treatment session.       Plan: Continue per plan of care.  Progress treatment as tolerated.         POC Expires Auth Status Start Date Expiration Date PT Visit Limit           Date        Used        Remaining           Diagnosis:  R ankle tri mal ORIF 24   Precautions:  WBAT in boot   Comparable signs 1) Steps  2) Walking   Primary Impairments: 1) Dorsiflexion  2) Strength    Patient Goals Walk normal    Manual Therapy 5/16 5/22 5/23 5/28 5/30 6/3   TCJ mobs SP GIV-GV SK GIV-GV SP GIV-GIV SP GIV-GV SP GIV-GV SP   Ankle PROM SP SK SP  SP SP  SP   Rearfoot mobs SP GIV  SK GIV SP GIV  SP GV SP GIV SP GIV   Great toe mobs SP GIV SK GIV SP GIV SP GIV SP GIV SP GIV   Forefoot mobs SP GIV SK GIV SP GIV SP GIV SP GIV SP GIV   Edema          IASTM/EPAT         Re-evaluation      SP    Exercise Diary          Therapeutic Exercise         Bike/TM 5' min ROM/BF 5' min ROM/BF 5' min ROM 5' 5 min ROM/BF 5 min   Ankle DF mob   10x10\" 10x10\"     Gas/Soleus St 10x10\" ea 10\"x10 ea 10x10\" 10x10\" off step      Heel Raises Leg press 25# 2x20 Leg press 25# 2x20  Seated 2x15 10#     Incline 2x15  [id number] 10# Seated 3x15 10 Seated 3x15, 10#  Leg press:  b/l 30# 2x10  RLE 5#, 2x10                              Neuromuscular " "Re-education         Short foot         Toe yoga 10x 10x       SLS  30\"x3 30\"x3 30\"x3 foam 30\"x3 30\"x3 foam 30''x3 foam   Wobble board Balance 1.5'/1.5  Taps 20x  Balance 1.5'/1.5  Taps 20x  Balance 2' ea  Taps SL 20x Baps 20x ea Baps 20x ea    Balance 1.5'/1.5' Wobble a/p & m/l 20x ea    Balance 1'x2   RDL          Side stepping  Xwalks RTB 2 laps Xwalks RTB 2 laps       Rebounder    SL red 2x15 SL red 2x15 SL red fwd&lat 2x10            Therapeutic Activities         Education          SF side steps         Gait training          Step ups on bosu ball    2x10 R  2x10 R  RLE SLS, Fwd&lat 2x10            Modalities                              "

## 2024-06-06 ENCOUNTER — OFFICE VISIT (OUTPATIENT)
Dept: PHYSICAL THERAPY | Facility: CLINIC | Age: 69
End: 2024-06-06
Payer: COMMERCIAL

## 2024-06-06 DIAGNOSIS — S82.851A CLOSED TRIMALLEOLAR FRACTURE OF RIGHT ANKLE, INITIAL ENCOUNTER: Primary | ICD-10-CM

## 2024-06-06 PROCEDURE — 97112 NEUROMUSCULAR REEDUCATION: CPT

## 2024-06-06 PROCEDURE — 97140 MANUAL THERAPY 1/> REGIONS: CPT

## 2024-06-06 PROCEDURE — 97110 THERAPEUTIC EXERCISES: CPT

## 2024-06-06 NOTE — PROGRESS NOTES
"  Daily Note     Today's date: 2024  Patient name: Dawit Segura  : 1955  MRN: [id number]  Referring provider: Mason Connolly DO  Dx:   Encounter Diagnosis     ICD-10-CM    1. Closed trimalleolar fracture of right ankle, initial encounter  S82.851A           Subjective: Reports 4/10 pain upon arrival. Also c/o ankle stiffness.      Objective: See treatment diary below      Assessment: Challenged with ankle stability and strengthening with higher level strengthening and proprioceptive training. Encouraged minimal use of UE support as able. Most exercises performed without shoe to promote increased challenge. Noted weakness especially with gastroc strengthening. No increased pain reported throughout treatment session. Continues to have weakness with push off and verbal ceuing needed fo       Plan: Continue per plan of care.  Progress treatment as tolerated.         POC Expires Auth Status Start Date Expiration Date PT Visit Limit           Date        Used        Remaining           Diagnosis:  R ankle tri mal ORIF 24   Precautions:  WBAT in boot   Comparable signs 1) Steps  2) Walking   Primary Impairments: 1) Dorsiflexion  2) Strength    Patient Goals Walk normal    Manual Therapy 6/6    5/30 6/3   TCJ mobs     SP GIV-GV SP   Ankle PROM     SP  SP   Rearfoot mobs     SP GIV SP GIV   Great toe mobs     SP GIV SP GIV   Forefoot mobs     SP GIV SP GIV   Edema          IASTM/EPAT         Re-evaluation      SP    Exercise Diary          Therapeutic Exercise         Bike/TM 5 min bike    5 min ROM/BF 5 min   Ankle DF mob         Gas/Soleus St         Heel Raises Seated 2x15 15#  Standing B-2x10  RLE 5# 2x15  B/l 30# 2x15      Seated 3x15 10 Seated 3x15, 10#  Leg press:  b/l 30# 2x10  RLE 5#, 2x10                              Neuromuscular Re-education         Short foot         Toe yoga         SLS      30\"x3 foam 30''x3 foam   Wobble board Wobble taps 20x ea SL    SL balance 1' x2    Baps 20x " ea    Balance 1.5'/1.5' Wobble a/p & m/l 20x ea    Balance 1'x2   RDL          Side stepping          Rebounder SL Yellow, fwd 20x    SL red 2x15 SL red fwd&lat 2x10            Therapeutic Activities         Education          SF side steps RTB 2 laps         Gait training          Step ups on bosu ball       RLE SLS, Fwd&lat 2x10            Modalities

## 2024-06-10 ENCOUNTER — OFFICE VISIT (OUTPATIENT)
Dept: PHYSICAL THERAPY | Facility: CLINIC | Age: 69
End: 2024-06-10
Payer: COMMERCIAL

## 2024-06-10 DIAGNOSIS — S82.851A CLOSED TRIMALLEOLAR FRACTURE OF RIGHT ANKLE, INITIAL ENCOUNTER: Primary | ICD-10-CM

## 2024-06-10 PROCEDURE — 97530 THERAPEUTIC ACTIVITIES: CPT

## 2024-06-10 PROCEDURE — 97112 NEUROMUSCULAR REEDUCATION: CPT

## 2024-06-10 NOTE — PROGRESS NOTES
Daily Note     Today's date: 6/10/2024  Patient name: Dawit Segura  : 1955  MRN: [id number]  Referring provider: Mason Connolly DO  Dx:   Encounter Diagnosis     ICD-10-CM    1. Closed trimalleolar fracture of right ankle, initial encounter  S82.851A           Subjective: Reports 4/10 pain upon arrival. Also c/o ankle stiffness, but getting better.      Objective: See treatment diary below      Assessment: Initiated TM walking today. Difficulty maintaining proper stride length, with cues to longer steps as well as promoting push off. Challenged with ankle stability and strengthening with higher level strengthening and proprioceptive training. Additional TE today to promote push off and calf strengthening. Patient very challenged with weight shifting and promoting push off with noted weakness.      Plan: Continue per plan of care.  Progress treatment as tolerated.         POC Expires Auth Status Start Date Expiration Date PT Visit Limit           Date        Used        Remaining           Diagnosis:  R ankle tri mal ORIF 24   Precautions:  WBAT in boot   Comparable signs 1) Steps  2) Walking   Primary Impairments: 1) Dorsiflexion  2) Strength    Patient Goals Walk normal    Manual Therapy  6/10       TCJ mobs         Ankle PROM  AMC       Rearfoot mobs         Great toe mobs         Forefoot mobs         Edema          IASTM/EPAT         Re-evaluation          Exercise Diary          Therapeutic Exercise         Bike/TM 5 min bike TM 0.7-0.9mph 5min, promote push off       Ankle DF mob         Gas/Soleus St         Heel Raises Seated 2x15 15#  Standing B-2x10  RLE 5# 2x15  B/l 30# 2x15                                     Neuromuscular Re-education         Short foot         Toe yoga         SLS          Wobble board Wobble taps 20x ea SL    SL balance 1' x2 Wobble taps 20x ea SL    SL balance 1' x2       RDL          Side stepping          Rebounder SL Yellow, fwd 20x Foam SL Yellow, fwd /lateral  20x                Therapeutic Activities         Education          SF side steps RTB 2 laps         Gait training          Step ups on bosu ball          Toe walking  Promote push off 3 laps       Lunge to step  Promote push off 15x       Backwards walking  Promote push off, 3 laps       Modalities                               [Normal] : uterus [No Bleeding] : there was no active vaginal bleeding [Uterine Adnexae] : were not tender and not enlarged

## 2024-06-13 ENCOUNTER — OFFICE VISIT (OUTPATIENT)
Dept: PHYSICAL THERAPY | Facility: CLINIC | Age: 69
End: 2024-06-13
Payer: COMMERCIAL

## 2024-06-13 DIAGNOSIS — S82.851A CLOSED TRIMALLEOLAR FRACTURE OF RIGHT ANKLE, INITIAL ENCOUNTER: Primary | ICD-10-CM

## 2024-06-13 PROCEDURE — 97112 NEUROMUSCULAR REEDUCATION: CPT

## 2024-06-13 PROCEDURE — 97110 THERAPEUTIC EXERCISES: CPT

## 2024-06-13 PROCEDURE — 97530 THERAPEUTIC ACTIVITIES: CPT

## 2024-06-13 NOTE — PROGRESS NOTES
Daily Note     Today's date: 2024  Patient name: Dawit Segura  : 1955  MRN: [id number]  Referring provider: Mason Connolly DO  Dx:   Encounter Diagnosis     ICD-10-CM    1. Closed trimalleolar fracture of right ankle, initial encounter  S82.851A           Subjective: Reports 4/10 pain upon arrival. Also c/o ankle stiffness, but getting better.      Objective: See treatment diary below      Assessment: Completed treadmill walking again today. Incorporated incline today to promote push off. Continue to focus on SLS and gastroc strengthening. Patient able to demo improved strength by ability to complete heel raise today. Attempted some higher level proprioceptive training but difficulty due to weakness. Improved abiltiy to complete push off exercises today. Does require verbal and visual cueing for proper form and technique. Challenged and fatigued appropriately.    Plan: Continue per plan of care.  Progress treatment as tolerated.         POC Expires Auth Status Start Date Expiration Date PT Visit Limit           Date        Used        Remaining           Diagnosis:  R ankle tri mal ORIF 24   Precautions:  WBAT in boot   Comparable signs 1) Steps  2) Walking   Primary Impairments: 1) Dorsiflexion  2) Strength    Patient Goals Walk normal    Manual Therapy 6/6 6/10 6/13      TCJ mobs         Ankle PROM  AMC       Rearfoot mobs         Great toe mobs         Forefoot mobs         Edema          IASTM/EPAT         Re-evaluation          Exercise Diary          Therapeutic Exercise         Bike/TM 5 min bike TM 0.7-0.9mph 5min, promote push off TM 0.9-1.2mph , 15% incline, 5min, promote push off      Ankle DF mob         Gas/Soleus St         Heel Raises Seated 2x15 15#  Standing B-2x10  RLE 5# 2x15  B/l 30# 2x15    Up with 2, down on RLE, 10x    On incline-2x10    RLE 10# 3x15  B/l 30# 2x15                                 Neuromuscular Re-education         Short foot         Toe yoga         SLS           Wobble board Wobble taps 20x ea SL    SL balance 1' x2 Wobble taps 20x ea SL    SL balance 1' x2 Wobble taps 20x ea SL    SL balance 1' x2      RDL          Side stepping          Rebounder SL Yellow, fwd 20x Foam SL Yellow, fwd /lateral 20x Foam SL Yellow, fwd /lateral 20x      Star balance   attempted      Therapeutic Activities         Education          SF side steps RTB 2 laps         Gait training          Lunges onto bosu   2x10      Step ups on bosu ball    2x10      Toe walking  Promote push off 3 laps Promote push off 3 laps      Lunge to step  Promote push off 15x Promote push off 15x      Backwards walking  Promote push off, 3 laps Promote push off, 3 laps      Modalities

## 2024-06-17 ENCOUNTER — OFFICE VISIT (OUTPATIENT)
Dept: PHYSICAL THERAPY | Facility: CLINIC | Age: 69
End: 2024-06-17
Payer: COMMERCIAL

## 2024-06-17 DIAGNOSIS — S82.851A CLOSED TRIMALLEOLAR FRACTURE OF RIGHT ANKLE, INITIAL ENCOUNTER: Primary | ICD-10-CM

## 2024-06-17 PROCEDURE — 97110 THERAPEUTIC EXERCISES: CPT

## 2024-06-17 PROCEDURE — 97530 THERAPEUTIC ACTIVITIES: CPT

## 2024-06-17 PROCEDURE — 97112 NEUROMUSCULAR REEDUCATION: CPT

## 2024-06-17 NOTE — PROGRESS NOTES
Daily Note     Today's date: 2024  Patient name: Dawit Segura  : 1955  MRN: [id number]  Referring provider: Mason Connolly DO  Dx:   Encounter Diagnosis     ICD-10-CM    1. Closed trimalleolar fracture of right ankle, initial encounter  S82.851A           Subjective: Reports 4/10 pain upon arrival. Also c/o ankle stiffness, but getting better.      Objective: See treatment diary below      Assessment: Continues to be challenged with push off today due weakness. He notes fatigue with leg press. Did need verbal and visual cueing for form throughout session to this date. Given bethany walk outs to work on eccentric control, which patient is still challenged with as he demonstrates difficulty with heel strike due to weakness. . Challenged and fatigued appropriately. Creeper walks were moderately challenging to maintain heel lift to this date with noted lack of eccentric control.     Plan: Continue per plan of care.  Progress treatment as tolerated.         POC Expires Auth Status Start Date Expiration Date PT Visit Limit           Date        Used        Remaining           Diagnosis:  R ankle tri mal ORIF 24   Precautions:  WBAT in boot   Comparable signs 1) Steps  2) Walking   Primary Impairments: 1) Dorsiflexion  2) Strength    Patient Goals Walk normal    Manual Therapy 6/6 6/10 6/13 6/17     TCJ mobs         Ankle PROM  AMC       Rearfoot mobs         Great toe mobs         Forefoot mobs         Edema          IASTM/EPAT         Re-evaluation          Exercise Diary          Therapeutic Exercise         Bike/TM 5 min bike TM 0.7-0.9mph 5min, promote push off TM 0.9-1.2mph , 15% incline, 5min, promote push off      Ankle DF mob         Gas/Soleus St         Heel Raises Seated 2x15 15#  Standing B-2x10  RLE 5# 2x15  B/l 30# 2x15    Up with 2, down on RLE, 10x    On incline-2x10    RLE 10# 3x15  B/l 30# 2x15 RLE 3x15 15#    B/l 35# 2x15    Seated 15# 2x20       Ant tib raises     5# 2x15                        Neuromuscular Re-education         Short foot         Toe yoga         SLS          Wobble board Wobble taps 20x ea SL    SL balance 1' x2 Wobble taps 20x ea SL    SL balance 1' x2 Wobble taps 20x ea SL    SL balance 1' x2      RDL          Creeper walk     2 laps ea      Rebounder SL Yellow, fwd 20x Foam SL Yellow, fwd /lateral 20x Foam SL Yellow, fwd /lateral 20x      Star balance   attempted      Therapeutic Activities         Education          SF side steps RTB 2 laps    RTB 2 laps      Gait training          Lunges onto bosu   2x10      Step ups on bosu ball    2x10      Toe walking  Promote push off 3 laps Promote push off 3 laps      Lunge to step  Promote push off 15x Promote push off 15x      Backwards walking  Promote push off, 3 laps Promote push off, 3 laps Lov[place] walk outs 10#      Modalities

## 2024-06-20 ENCOUNTER — OFFICE VISIT (OUTPATIENT)
Dept: PHYSICAL THERAPY | Facility: CLINIC | Age: 69
End: 2024-06-20
Payer: COMMERCIAL

## 2024-06-20 DIAGNOSIS — S82.851A CLOSED TRIMALLEOLAR FRACTURE OF RIGHT ANKLE, INITIAL ENCOUNTER: Primary | ICD-10-CM

## 2024-06-20 PROCEDURE — 97530 THERAPEUTIC ACTIVITIES: CPT

## 2024-06-20 PROCEDURE — 97112 NEUROMUSCULAR REEDUCATION: CPT

## 2024-06-20 PROCEDURE — 97110 THERAPEUTIC EXERCISES: CPT

## 2024-06-20 NOTE — PROGRESS NOTES
Daily Note     Today's date: 2024  Patient name: Dawit Segura  : 1955  MRN: [id number]  Referring provider: Mason Connolly DO  Dx:   Encounter Diagnosis     ICD-10-CM    1. Closed trimalleolar fracture of right ankle, initial encounter  S82.851A           Subjective: Reports 3/10 pain upon arrival. Also c/o ankle stiffness, but getting better.      Objective: See treatment diary below      Assessment: Continues to be challenged with push off due weakness, but improving. Overall demo improved gastroc strength but still challenged and fatigued appropriately with higher level strengthening, especially with eccentric control. Challenged with coordination of walking lunges, visual and verbal cues for proper technique. Able to complete TE without any increased pain during or post tx. Noted soreness post tx.    Plan: Continue per plan of care.  Progress treatment as tolerated.         POC Expires Auth Status Start Date Expiration Date PT Visit Limit           Date        Used        Remaining           Diagnosis:  R ankle tri mal ORIF 24   Precautions:  WBAT in boot   Comparable signs 1) Steps  2) Walking   Primary Impairments: 1) Dorsiflexion  2) Strength    Patient Goals Walk normal    Manual Therapy 6/6 6/10 6/13 6/17 6/20    TCJ mobs         Ankle PROM  [facility]C       Rearfoot mobs         Great toe mobs         Forefoot mobs         Edema          IASTM/EPAT         Re-evaluation          Exercise Diary          Therapeutic Exercise         Bike/TM 5 min bike TM 0.7-0.9mph 5min, promote push off TM 0.9-1.2mph , 15% incline, 5min, promote push off  TM 0.9-1.2mph , 15% incline, 5min, promote push off    Ankle DF mob         Gas/[address]         Heel Raises Seated 2x15 15#  Standing B-2x10  RLE 5# 2x15  B/l 30# 2x15    Up with 2, down on RLE, 10x    On incline-2x10    RLE 10# 3x15  B/l 30# 2x15 RLE 3x15 15#    B/l 35# 2x15    Seated 15# 2x20   Up with 2, down on RLE, 2x10    B/l 35#, 2x20  RLE 15#  2x20    Off 8' step, 2x10        Ant tib raises     5# 2x15 5# 2x15                      Neuromuscular Re-education         Short foot         Toe yoga         SLS          Wobble board Wobble taps 20x ea SL    SL balance 1' x2 Wobble taps 20x ea SL    SL balance 1' x2 Wobble taps 20x ea SL    SL balance 1' x2  Wobble taps 20x ea SL    SL balance 1' x2    RDL          Creeper walk     2 laps ea  2 laps ea    Rebounder SL Yellow, fwd 20x Foam SL Yellow, fwd /lateral 20x Foam SL Yellow, fwd /lateral 20x      Star balance   attempted      Therapeutic Activities         Education          SF side steps RTB 2 laps    RTB 2 laps      Gait training          Lunges onto bosu   2x10  2x10    Step ups on bosu ball    2x10  2x10    Toe walking  Promote push off 3 laps Promote push off 3 laps      Walking lunges     2 laps    Lunge to step  Promote push off 15x Promote push off 15x      Backwards walking  Promote push off, 3 laps Promote push off, 3 laps Shreya walk outs 10#  Shreya walk outs 20# , 10 laps    Modalities

## 2024-06-24 ENCOUNTER — OFFICE VISIT (OUTPATIENT)
Dept: PHYSICAL THERAPY | Facility: CLINIC | Age: 69
End: 2024-06-24
Payer: COMMERCIAL

## 2024-06-24 ENCOUNTER — TELEPHONE (OUTPATIENT)
Dept: OBGYN CLINIC | Facility: MEDICAL CENTER | Age: 69
End: 2024-06-24

## 2024-06-24 DIAGNOSIS — S82.851A CLOSED TRIMALLEOLAR FRACTURE OF RIGHT ANKLE, INITIAL ENCOUNTER: Primary | ICD-10-CM

## 2024-06-24 PROCEDURE — 97110 THERAPEUTIC EXERCISES: CPT

## 2024-06-24 PROCEDURE — 97112 NEUROMUSCULAR REEDUCATION: CPT

## 2024-06-24 PROCEDURE — 97530 THERAPEUTIC ACTIVITIES: CPT

## 2024-06-24 NOTE — TELEPHONE ENCOUNTER
Caller: Dawit     Doctor: Dr Connolly     Reason for call: The patient needs a new letter explaining the reason that and why he cannot return to work. The letter will be for May and June. 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 He is not coming into the office until 7/9.  Thank you   Please fax new letter to : [phone]    Call back#: [phone]

## 2024-06-24 NOTE — PROGRESS NOTES
Daily Note     Today's date: 2024  Patient name: Dawit Segura  : 1955  MRN: [id number]  Referring provider: Mason Connolly DO  Dx:   Encounter Diagnosis     ICD-10-CM    1. Closed trimalleolar fracture of right ankle, initial encounter  S82.851A           Subjective: Reports 4/10 pain upon arrival. Also c/o ankle stiffness, but getting better.      Objective: See treatment diary below      Assessment: Continues to be challenged with push off due to weakness, but improving. Overall demo improved gastroc strength but still challenged and fatigued appropriately with higher level strengthening, especially with eccentric control. Able to complete TE without any increased pain during or post tx.     Plan: Continue per plan of care.  Progress treatment as tolerated.         POC Expires Auth Status Start Date Expiration Date PT Visit Limit           Date        Used        Remaining           Diagnosis:  R ankle tri mal ORIF 24   Precautions:  WBAT in boot   Comparable signs 1) Steps  2) Walking   Primary Impairments: 1) Dorsiflexion  2) Strength    Patient Goals Walk normal    Manual Therapy 6/6 6/10 6/13 6/17 6/20 6/24   TCJ mobs         Ankle PROM  [facility]C       Rearfoot mobs         Great toe mobs         Forefoot mobs         Edema          IASTM/EPAT         Re-evaluation          Exercise Diary          Therapeutic Exercise         Bike/TM 5 min bike TM 0.7-0.9mph 5min, promote push off TM 0.9-1.2mph , 15% incline, 5min, promote push off  TM 0.9-1.2mph , 15% incline, 5min, promote push off TM 0.9-1.2mph , 15% incline, 5min, promote push off   Ankle DF mob         Gas/[address]         Heel Raises Seated 2x15 15#  Standing B-2x10  RLE 5# 2x15  B/l 30# 2x15    Up with 2, down on RLE, 10x    On incline-2x10    RLE 10# 3x15  B/l 30# 2x15 RLE 3x15 15#    B/l 35# 2x15    Seated 15# 2x20   Up with 2, down on RLE, 2x10    B/l 35#, 2x20  RLE 15# 2x20    Off 8' step, 2x10     Up with 2, down on RLE,  2x10    B/l 45#, 2x15  RLE 20# 2x15    Off 8' step, 2x10   Ant tib raises     5# 2x15 5# 2x15                      Neuromuscular Re-education         Short foot         Toe yoga         SLS       Bosu, 30''x3   Wobble board Wobble taps 20x ea SL    SL balance 1' x2 Wobble taps 20x ea SL    SL balance 1' x2 Wobble taps 20x ea SL    SL balance 1' x2  Wobble taps 20x ea SL    SL balance 1' x2 Wobble taps 20x ea SL    SL balance 1' x2   RDL          Creeper walk     2 laps ea  2 laps ea 2 laps   Rebounder SL Yellow, fwd 20x Foam SL Yellow, fwd /lateral 20x Foam SL Yellow, fwd /lateral 20x   Foam, yellow, fwd/lateral, 20x   Star balance   attempted      Therapeutic Activities         Education          SF side steps RTB 2 laps    RTB 2 laps   RTB 2 laps   Gait training          Lunges onto bosu   2x10  2x10    Step ups on bosu ball    2x10  2x10 2x10   Toe walking  Promote push off 3 laps Promote push off 3 laps      Walking lunges     2 laps 2 laps   Lunge to step  Promote push off 15x Promote push off 15x      Backwards walking  Promote push off, 3 laps Promote push off, 3 laps [place] walk outs 10#  [place] walk outs 20# , 10 laps [place] walk outs 20# , 5 laps   Modalities

## 2024-06-27 ENCOUNTER — APPOINTMENT (OUTPATIENT)
Dept: PHYSICAL THERAPY | Facility: CLINIC | Age: 69
End: 2024-06-27
Payer: COMMERCIAL

## 2024-06-28 ENCOUNTER — OFFICE VISIT (OUTPATIENT)
Dept: PHYSICAL THERAPY | Facility: CLINIC | Age: 69
End: 2024-06-28
Payer: COMMERCIAL

## 2024-06-28 DIAGNOSIS — S82.851A CLOSED TRIMALLEOLAR FRACTURE OF RIGHT ANKLE, INITIAL ENCOUNTER: Primary | ICD-10-CM

## 2024-06-28 PROCEDURE — 97530 THERAPEUTIC ACTIVITIES: CPT

## 2024-06-28 PROCEDURE — 97110 THERAPEUTIC EXERCISES: CPT

## 2024-06-28 PROCEDURE — 97112 NEUROMUSCULAR REEDUCATION: CPT

## 2024-06-28 NOTE — PROGRESS NOTES
Daily Note     Today's date: 2024  Patient name: Dawit Segura  : 1955  MRN: [id number]  Referring provider: Mason Connolly DO  Dx:   Encounter Diagnosis     ICD-10-CM    1. Closed trimalleolar fracture of right ankle, initial encounter  S82.851A           Subjective: Reports 4/10 pain upon arrival. Also c/o ankle stiffness, but getting better.      Objective: See treatment diary below      Assessment: Continues to be challenged with push off due to weakness, but improving. Overall demo improved gastroc strength but still challenged and fatigued appropriately with higher level strengthening, especially with eccentric control. Able to complete TE without any increased pain during or post tx.     Plan: Continue per plan of care.  Progress treatment as tolerated.         POC Expires Auth Status Start Date Expiration Date PT Visit Limit           Date        Used        Remaining           Diagnosis:  R ankle tri mal ORIF 24   Precautions:  WBAT in boot   Comparable signs 1) Steps  2) Walking   Primary Impairments: 1) Dorsiflexion  2) Strength    Patient Goals Walk normal    Manual Therapy 6/28 6/10 6/13 6/17 6/20 6/24   TCJ mobs         Ankle PROM  AMC       Rearfoot mobs         Great toe mobs         Forefoot mobs         Edema          IASTM/EPAT         Re-evaluation          Exercise Diary          Therapeutic Exercise         Bike/TM TM 0.9-1.2mph , 15% incline, 5min, promote push off TM 0.7-0.9mph 5min, promote push off TM 0.9-1.2mph , 15% incline, 5min, promote push off  TM 0.9-1.2mph , 15% incline, 5min, promote push off TM 0.9-1.2mph , 15% incline, 5min, promote push off   Ankle DF mob         Gas/[address]         Heel Raises Up with 2, down on RLE, 2x10    B/l 50#, 2x15  RLE 25# 2x15    Off 8' step, 2x10  Up with 2, down on RLE, 10x    On incline-2x10    RLE 10# 3x15  B/l 30# 2x15 RLE 3x15 15#    B/l 35# 2x15    Seated 15# 2x20   Up with 2, down on RLE, 2x10    B/l 35#, 2x20  RLE 15#  2x20    Off 8' step, 2x10     Up with 2, down on RLE, 2x10    B/l 45#, 2x15  RLE 20# 2x15    Off 8' step, 2x10   Ant tib raises     5# 2x15 5# 2x15                      Neuromuscular Re-education         Short foot         Toe yoga         SLS       Bosu, 30''x3   Wobble board Wobble taps 20x ea SL    SL balance 1' x2 Wobble taps 20x ea SL    SL balance 1' x2 Wobble taps 20x ea SL    SL balance 1' x2  Wobble taps 20x ea SL    SL balance 1' x2 Wobble taps 20x ea SL    SL balance 1' x2   RDL          Creeper walk  2 laps   2 laps ea  2 laps ea 2 laps   Rebounder  Foam SL Yellow, fwd /lateral 20x Foam SL Yellow, fwd /lateral 20x   Foam, yellow, fwd/lateral, 20x   Star balance   attempted      Therapeutic Activities         Education          SF side steps RTB 2 laps   RTB 2 laps   RTB 2 laps   Gait training          Lunges onto bosu 2x10  2x10  2x10    Step ups on bosu ball  2x10  2x10  2x10 2x10   Toe walking  Promote push off 3 laps Promote push off 3 laps      Walking lunges 2 laps    2 laps 2 laps   Lunge to step  Promote push off 15x Promote push off 15x      Big stick walk outs 7# 10 laps        Backwards walking [place], 20#   10 laps Promote push off, 3 laps Promote push off, 3 laps [place] walk outs 10#  [place] walk outs 20# , 10 laps Shreya walk outs 20# , 5 laps   Modalities

## 2024-07-01 ENCOUNTER — EVALUATION (OUTPATIENT)
Dept: PHYSICAL THERAPY | Facility: CLINIC | Age: 69
End: 2024-07-01
Payer: COMMERCIAL

## 2024-07-01 DIAGNOSIS — S82.851A CLOSED TRIMALLEOLAR FRACTURE OF RIGHT ANKLE, INITIAL ENCOUNTER: Primary | ICD-10-CM

## 2024-07-01 PROCEDURE — 97140 MANUAL THERAPY 1/> REGIONS: CPT

## 2024-07-01 NOTE — PROGRESS NOTES
PT Re-Evaluation     Today's date: 2024  Patient name: Dawit Segura  : 1955  MRN: [id number]  Referring provider: Mason Connolly DO  Dx:   Encounter Diagnosis     ICD-10-CM    1. Closed trimalleolar fracture of right ankle, initial encounter  S82.851A                        Assessment  Impairments: abnormal coordination, abnormal gait, abnormal muscle firing, abnormal or restricted ROM, abnormal movement, activity intolerance, impaired balance, impaired physical strength, lacks appropriate home exercise program, pain with function, safety issue, weight-bearing intolerance and poor body mechanics    Assessment details: RE 24 Dawit presents to re-evaluation today with slight improvements in strength. He notes better push off but at this point he is still demonstrates weakness with gastroc. He notes compensation with heel raises. Patient demonstrates improvement in ROM to this date. He will benefit from one more week to progress to HEP at this point and will continue to improve on his own. 2x a week for 1 wk.     RE 24 Dawit presents to re-evaluation today with slight improvements in strength and ROM. He is still challenged with heel raises, specifically gastroc strength and gait mechanics due to limited dorsiflexion and strength. Patient will continue to benefit from skilled PT at this time to improve strength ROM and overall function and continue to address remaining deficits.     IE:Dawit Segura is a pleasant 69 y.o. male presents with signs and symptoms consistent with:   Closed trimalleolar fracture of right ankle, initial encounter    Problem List:  1) Dorsiflexion  2) Strength    Comparable signs:  1) Walking  2) Stairs    he has hypomobility with dorsiflexion, rear foot mobility, decreased ankle strength, and impaired gait resulting in worry over not knowing what's wrong and fear of not being able to keep active. These impairments listed above are preventing the patient from  participating in functional activity. No further referral appears necessary at this time based upon examination results, and is negative for any red flags. Prognosis is good based off HEP compliance and attendance to physical therapy 2x a week.  Positive prognostic indicators include positive attitude toward recovery and good understanding of diagnosis and treatment plan options.  Negative prognostic indicators include chronicity of symptoms and hypertension.  Patent will benefit from skilled physical therapy at this time to address deficits to improve overall function and return to PLOF. Patient verbalized understanding of POC, HEP, and return demonstrated HEP. All questions were answered to patients satisfaction.     Please contact me if you have any questions or recommendations. Thank you for the referral and the opportunity to share in Dawit Segura's care.              Understanding of Dx/Px/POC: good     Prognosis: good    Goals  Impairment Goals 4-6 weeks Progressing 7/1  In order to maximize function patient will be able to...   - Decrease intensity/duration/frequency of pain to 2/10  - Demonstrate 20 degrees of Dorsiflexion for normal gait  - Demonstrate 60 degrees of great toe extension for normal gait  - Increase hip strength to 5/5 throughout  - Increase ankle strength to 5/5 throughout   - Demonstrate a SLS on uneven and even surfaces for 30 seconds without compensations     Functional Goals 6-8 weeks Progressing 7/1  In order to return to prior level of function patient will be able to...   - Participate in ADL's/IADL's/sport specific activities with no greater than 2/10 pain.    - Increase Functional Status Measure (FOTO) to predicted outcome scores  - Demonstrate independence and compliant with HEP  - Demonstrate a squat and or sit to stand with good mechanics and eccentric control without pain/difficulty/compensation  - Ascend and descend stairs without increased pain/compensation/difficulty and a  reciprocal gait pattern.  - Patient will be able to demonstrate good gait mechanics without compensations.   - Demonstrate ankle stability on even and uneven surfaces as seen by minimal to no compensations or LOB in order to progress patient to running and higher level activities        Plan  Patient would benefit from: skilled PT  Planned modality interventions: cryotherapy, electrical stimulation/Russian stimulation, TENS and thermotherapy: hydrocollator packs    Planned therapy interventions: joint mobilization, manual therapy, neuromuscular re-education, patient education, strengthening, stretching, therapeutic activities, therapeutic exercise, home exercise program, functional ROM exercises, Adkins taping, postural training, gait training, balance, balance/weight bearing training, flexibility, graded exercise and motor coordination training    Frequency: 2x week  Duration in weeks: 1  Treatment plan discussed with: patient        Subjective Evaluation    History of Present Illness  Mechanism of injury: RE 7/1/24 Patient is 80% improved. He notes some pain with walking. He reports that walking is better and less pain. He demonstrates improved ability to ambulate with increased endurance.     RE 5/30/24 Patient presents to PT s/p R trimal ORIF on 2/20/24. He notes that he still has some pain with walking and difficulty with gait. He notes improvement with ROM, despite still feeling stiff. He is currently walking in a sneaker.  He notes 50% improvement. He is still challenged with walking slow    IE:Patient presents to PT s/p R tri maleolar ORIF on 2/20/024 with Dr. Collier. He fell down the stairs and broke his ankle. Patient is currently in a walking boot, and sometimes will put weight on it. He reports some pain with walking, and stairs. He is currently a  but is not working. As of last MD visit he is cleared for WB and ROM. He is no longer taking any pain medication. He is not cleared for  driving.     Difficulty with: up and down stairs, walking long distances.   Patient Goals  Patient goals for therapy: decreased pain, independence with ADLs/IADLs and return to work  Patient goal: able to do every day tasks (making progress toward it)  Pain  Current pain ratin  At best pain rating: 3  At worst pain ratin  Location: right round the ankle  Quality: dull ache, tight and pressure  Aggravating factors: stair climbing, walking and standing      Diagnostic Tests  Abnormal x-ray: see chart for xray.  Treatments  No previous or current treatments        Objective     Active Range of Motion     Right Ankle/Foot   Dorsiflexion (ke): 15 degrees   Dorsiflexion (kf): 0 degrees   Plantar flexion: 46 degrees   Inversion: 30 degrees   Eversion: 10 degrees   Great toe extension: 80 degrees     Passive Range of Motion     Right Ankle/Foot    Dorsiflexion (ke): 15 degrees     Strength/Myotome Testing     Left Ankle/Foot   Plantar flexion: 5    Right Ankle/Foot   Plantar flexion: 4+ (15)  Great toe flexion: 5  Great toe extension: 5    Additional Strength Details    Right          Fib Brevis 4+/5  Fib Longus 4+/5  Ant Tib 4+/5  Post Tib 4+/5    Left:  Fib Brevis 5/5  Fib Longus 5/5  Ant Tib 5/5  Post Tib 5/5      General Comments:      Ankle/Foot Comments   SLS R: 30s 30 L   SLS R: 10s foam 30s L               POC Expires Auth Status Start Date Expiration Date PT Visit Limit           Date        Used        Remaining           Diagnosis:  R ankle tri mal ORIF 24   Precautions:  WBAT in boot   Comparable signs 1) Steps  2) Walking   Primary Impairments: 1) Dorsiflexion  2) Strength    Patient Goals Walk normal    Manual Therapy    TCJ mobs         Ankle PROM         Rearfoot mobs         Great toe mobs         Forefoot mobs         Edema          IASTM/EPAT         Re-evaluation   SP       Exercise Diary          Therapeutic Exercise         Bike/TM TM 0.9-1.2mph , 15% incline,  5min, promote push off  TM 0.9-1.2mph , 15% incline, 5min, promote push off  TM 0.9-1.2mph , 15% incline, 5min, promote push off TM 0.9-1.2mph , 15% incline, 5min, promote push off   Ankle DF mob         Gas/[address]         Heel Raises Up with 2, down on RLE, 2x10    B/l 50#, 2x15  RLE 25# 2x15    Off 8' step, 2x10 Up on 2 down RLE 2x10     B/l 50# 2x15 Up with 2, down on RLE, 10x    On incline-2x10    RLE 10# 3x15  B/l 30# 2x15 RLE 3x15 15#    B/l 35# 2x15    Seated 15# 2x20   Up with 2, down on RLE, 2x10    B/l 35#, 2x20  RLE 15# 2x20    Off 8' step, 2x10     Up with 2, down on RLE, 2x10    B/l 45#, 2x15  RLE 20# 2x15    Off 8' step, 2x10   Ant tib raises     5# 2x15 5# 2x15                      Neuromuscular Re-education         Short foot         Toe yoga         SLS       Bosu, 30''x3   Wobble board Wobble taps 20x ea SL    SL balance 1' x2  Wobble taps 20x ea SL    SL balance 1' x2  Wobble taps 20x ea SL    SL balance 1' x2 Wobble taps 20x ea SL    SL balance 1' x2   RDL          Creeper walk  2 laps 2 laps   2 laps ea  2 laps ea 2 laps   Rebounder   Foam SL Yellow, fwd /lateral 20x   Foam, yellow, fwd/lateral, 20x   Star balance   attempted      Therapeutic Activities         Education          SF side steps RTB 2 laps RTB 2 laps  RTB 2 laps   RTB 2 laps   Gait training          Lunges onto bosu 2x10  2x10  2x10    Step ups on bosu ball  2x10  2x10  2x10 2x10   Toe walking   Promote push off 3 laps      Walking lunges 2 laps    2 laps 2 laps   Lunge to step   Promote push off 15x      Big stick walk outs 7# 10 laps        Backwards walking Shreya, 20#   10 laps  Promote push off, 3 laps Shreya walk outs 10#  [place] walk outs 20# , 10 laps Shreya walk outs 20# , 5 laps   Modalities

## 2024-07-03 ENCOUNTER — OFFICE VISIT (OUTPATIENT)
Dept: PHYSICAL THERAPY | Facility: CLINIC | Age: 69
End: 2024-07-03
Payer: COMMERCIAL

## 2024-07-03 DIAGNOSIS — S82.851A CLOSED TRIMALLEOLAR FRACTURE OF RIGHT ANKLE, INITIAL ENCOUNTER: Primary | ICD-10-CM

## 2024-07-03 PROCEDURE — 97110 THERAPEUTIC EXERCISES: CPT

## 2024-07-03 PROCEDURE — 97112 NEUROMUSCULAR REEDUCATION: CPT

## 2024-07-03 NOTE — PROGRESS NOTES
Daily Note     Today's date: 7/3/2024  Patient name: Dawit Segura  : 1955  MRN: [id number]  Referring provider: Mason Connolly DO  Dx:   Encounter Diagnosis     ICD-10-CM    1. Closed trimalleolar fracture of right ankle, initial encounter  S82.851A           Subjective: No new complaints upon arrival.      Objective: See treatment diary below      Assessment: Spent time focusing on exercises for HEP. Updated and reviewed HEP with good pt understanding.      Plan: Pt d/c to ongoing HEP.       POC Expires Auth Status Start Date Expiration Date PT Visit Limit           Date        Used        Remaining           Diagnosis:  R ankle tri mal ORIF 24   Precautions:  WBAT in boot   Comparable signs 1) Steps  2) Walking   Primary Impairments: 1) Dorsiflexion  2) Strength    Patient Goals Walk normal    Manual Therapy 6/28 7/1 7/3 6/17 6/20 6/24   TCJ mobs         Ankle PROM         Rearfoot mobs         Great toe mobs         Forefoot mobs         Edema          IASTM/EPAT         Re-evaluation   SP       Exercise Diary          Therapeutic Exercise         Bike/TM TM 0.9-1.2mph , 15% incline, 5min, promote push off  TM 0.9-1.2mph , 15% incline, 5min, promote push off  TM 0.9-1.2mph , 15% incline, 5min, promote push off TM 0.9-1.2mph , 15% incline, 5min, promote push off   Ankle DF mob         Gas/[address]         Heel Raises Up with 2, down on RLE, 2x10    B/l 50#, 2x15  RLE 25# 2x15    Off 8' step, 2x10 Up on 2 down RLE 2x10     B/l 50# 2x15 B 2x10  RLE only 2x10    Off 6'' step 2x10    B 55# 3x10  RLE 25# 3x10 RLE 3x15 15#    B/l 35# 2x15    Seated 15# 2x20   Up with 2, down on RLE, 2x10    B/l 35#, 2x20  RLE 15# 2x20    Off 8' step, 2x10     Up with 2, down on RLE, 2x10    B/l 45#, 2x15  RLE 20# 2x15    Off 8' step, 2x10   Ant tib raises     5# 2x15 5# 2x15                      Neuromuscular Re-education         Short foot         Toe yoga         SLS    Foam 30''x3   Bosu, 30''x3   Wobble board Wobble  taps 20x ea SL    SL balance 1' x2    Wobble taps 20x ea SL    SL balance 1' x2 Wobble taps 20x ea SL    SL balance 1' x2   RDL          Creeper walk  2 laps 2 laps   2 laps ea  2 laps ea 2 laps   Rebounder      Foam, yellow, fwd/lateral, 20x   Star balance         Therapeutic Activities         Education          SF side steps RTB 2 laps RTB 2 laps  RTB 2 laps   RTB 2 laps   Gait training          Lunges onto bosu 2x10  No bosu, 2x10 ea  2x10    Step ups on bosu ball  2x10  No bosu,  W/ SLS on 6'' 2x10  2x10 2x10   Toe walking   3 laps      Walking lunges 2 laps    2 laps 2 laps   Lunge to step         Big stick walk outs 7# 10 laps        Backwards walking [place], 20#   10 laps   Shreya walk outs 10#  Shreya walk outs 20# , 10 laps [place] walk outs 20# , 5 laps   Modalities

## 2024-07-09 ENCOUNTER — APPOINTMENT (OUTPATIENT)
Dept: RADIOLOGY | Facility: AMBULARY SURGERY CENTER | Age: 69
End: 2024-07-09
Attending: STUDENT IN AN ORGANIZED HEALTH CARE EDUCATION/TRAINING PROGRAM
Payer: COMMERCIAL

## 2024-07-09 DIAGNOSIS — S82.851A CLOSED TRIMALLEOLAR FRACTURE OF RIGHT ANKLE, INITIAL ENCOUNTER: Primary | ICD-10-CM

## 2024-07-09 DIAGNOSIS — S82.851A CLOSED TRIMALLEOLAR FRACTURE OF RIGHT ANKLE, INITIAL ENCOUNTER: ICD-10-CM

## 2024-07-09 PROCEDURE — 99213 OFFICE O/P EST LOW 20 MIN: CPT | Performed by: STUDENT IN AN ORGANIZED HEALTH CARE EDUCATION/TRAINING PROGRAM

## 2024-07-09 PROCEDURE — 73610 X-RAY EXAM OF ANKLE: CPT

## 2024-07-09 RX ORDER — MELOXICAM 15 MG/1
15 TABLET ORAL DAILY
Qty: 30 TABLET | Refills: 2 | Status: SHIPPED | OUTPATIENT
Start: 2024-07-09

## 2024-07-09 NOTE — PROGRESS NOTES
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  I discussed with the patient the radiographic findings of some posttraumatic arthritis which correlates with his symptoms that he is experiencing lingering with the ankle.  Recommended continuing and starting meloxicam 15 mg p.o. once daily as needed for discomfort.  Instructed the patient to take the medication with food   Patient's fractures have gone on to union with no longer visualized fracture lines.  The patient has slight decrease in the anterior tibiotalar joint space indicating some posttraumatic arthritis  The patient has began physical therapy and he was recommended to continue physical therapy.   Continue weightbearing as tolerated to the right lower extremity  ROMAT right ankle  Work activities as tolerated  Patient has completed physical therapy   Pt to follow up in 12 weeks for repeat x-ray and re-evaluation  _____________________________________________________  CHIEF COMPLAINT:  Chief Complaint   Patient presents with    Right Ankle - Post-op         SUBJECTIVE:  Dawit Segura is a 69 y.o. male who presents 5 days status post right trimalleolar ankle fracture. He has been compliant with his nonweightbearing status.  Patient reports tripping down 3 stairs while carrying laundry down at his house and landing on his left ankle.  He states he felt immediate pain and swelling after the incident.  He states most of his pain is concentrated diffusely across the right ankle.  He states the pain is exacerbated with any attempts at movement and alleviated with rest.  He has been taking advil for pain control which  provides him with adequate relief. He denies any previous trauma or surgery to the ankle. He denies any numbness or paresthesias in the right lower extremity.    Interval history 3/4/2024  Presents approximately 2 weeks status post ORIF right ankle from a closed right trimalleolar fracture.  He has been compliant with his nonweightbearing status and presents today in a wheelchair.  He endorses mild pain diffusely around the right ankle and swelling, especially over the lateral aspect of the ankle.  He states his pain today is a 3/10. He states he has not started PT yet.  He denies any numbness tingling or paresthesias in the right lower extremity.  Denies fevers or chills    Interval history 4/15/2024  Presents approximately 8 weeks status post ORIF right ankle from a closed right trimalleolar fracture.  He has not been compliant with his nonweightbearing status, he states he walks around the house without the boot and puts weight on the foot. He stopped using the boot several weeks ago. He also has not been compliant with dvt ppx. He stopped taking asa 4 weeks ago. He has not started PT yet, he states he was waiting until this appointment.  It was prescribed at his last appointment. he endorses mild pain over the lateral ankle.  He denies any numbness tingling or paresthesias in the right lower extremity.  Denies fevers or chills    Interval History 4/28/2024  Presents approximately 3 months after his right fracture fixation.  He reports he has been seeing physical therapy for range of motion exercises and is going well.  He reports he has been seeing them for roughly a month.  He reports moderate soreness at the anterior ankle that is worse when he wears his sandals in the house.  He denies new injury he denies numbness and tingling.  He has continued to walk on it without a boot.  He would like 6 weeks off of work due to the pain that he experiences while ambulating.    Interval history 7/9/2024  Patient is now 4  and half months out from a right trimalleolar ankle fracture status post open reduction internal fixation.  The patient has continued to work diligently with physical therapy to regain his range of motion and strength in the lower extremity.  He states that he is doing well however still complains of some mild discomfort in the anterior aspect of the ankle.  This is more prominent with with his first several steps of the day and after long periods of activity and can change with the weather.  He denies any symptoms associated with the medial or lateral incisions.  Denies any numbness or paresthesias.  Denies any fevers or chills    PAST MEDICAL HISTORY:  Past Medical History:   Diagnosis Date    Hyperlipidemia     Hypertension     Sleep apnea     NO CPAP    Tremors of nervous system     per [place]    Vitamin D deficiency disease        PAST SURGICAL HISTORY:  Past Surgical History:   Procedure Laterality Date    COLONOSCOPY      MA OPEN TX TRIMALLEOLAR ANKLE FX W/O FIXJ PST LIP Right 2/20/2024    Procedure: OPEN REDUCTION W/ INTERNAL FIXATION (ORIF) ANKLE;  Surgeon: Mason Connolly DO;  Location: [facility];  Service: Orthopedics       FAMILY HISTORY:  Family History   Problem Relation Age of Onset    Colon cancer Brother     Stroke Brother     Heart disease Brother         pacemaker in situ       SOCIAL HISTORY:  Social History     Tobacco Use    Smoking status: Never    Smokeless tobacco: Never   Vaping Use    Vaping status: Never Used   Substance Use Topics    Alcohol use: Not Currently     Comment: No use     Drug use: Never     Comment: No use       MEDICATIONS:    Current Outpatient Medications:     EPINEPHrine (EPIPEN) 0.3 mg/0.3 mL SOAJ, Inject 0.3 mL (0.3 mg total) into a muscle once as needed for anaphylaxis (severe allergic reaction) for up to 1 dose After using, call 911 and come to hospital right away., Disp: 0.6 mL, Rfl: 0    ibuprofen (MOTRIN) 600 mg tablet, Take 1 tablet (600 mg total) by mouth every  "6 (six) hours as needed for mild pain, Disp: 20 tablet, Rfl: 0    losartan (COZAAR) 100 MG tablet, TAKE 1 TABLET BY MOUTH EVERY DAY (Patient taking differently: Take by mouth daily after lunch), Disp: 90 tablet, Rfl: 3    meloxicam (Mobic) 15 mg tablet, Take 1 tablet (15 mg total) by mouth daily Take medication with food, Disp: 30 tablet, Rfl: 2    montelukast (SINGULAIR) 10 mg tablet, Take 1 tablet (10 mg total) by mouth daily at bedtime, Disp: 90 tablet, Rfl: 0    aspirin 325 mg tablet, Take 1 tablet (325 mg total) by mouth 2 (two) times a day (Patient not taking: Reported on 4/29/2024), Disp: 84 tablet, Rfl: 0    ALLERGIES:  Allergies   Allergen Reactions    Sesame Seed (Diagnostic) - Food Allergy Anaphylaxis       REVIEW OF SYSTEMS:  MSK: Left ankle pain  Neuro: None  Pertinent items are otherwise noted in HPI.  A comprehensive review of systems was otherwise negative.    LABS:  HgA1c: No results found for: \"HGBA1C\"  BMP:   Lab Results   Component Value Date    CALCIUM 9.1 02/15/2024    K 4.3 02/15/2024    CO2 29 02/15/2024     02/15/2024    BUN 20 02/15/2024    CREATININE 0.77 02/15/2024     CBC: No components found for: \"CBC\"    _____________________________________________________  PHYSICAL EXAMINATION:  Vital signs: There were no vitals taken for this visit.  General: No acute distress, awake and alert  Psychiatric: Mood and affect appear appropriate  HEENT: Trachea Midline, No torticollis, no apparent facial trauma  Cardiovascular: No audible murmurs; Extremities appear perfused  Pulmonary: No audible wheezing or stridor  Skin: No open lesions; see further details (if any) below    MUSCULOSKELETAL EXAMINATION:  Extremities: Right lower extremity    The right lower extremity was exposed and inspected.  Surgical incisions are healed without erythema, drainage or signs of dehiscence. All other visible skin intact without erythema, ecchymosis, effusion or obvious osseous deformity.  Patient has " reproducible tenderness to palpation over the anterior and anterior medial aspect of the tibiotalar joint.  This reproduces his symptoms..  He is NTTP over the lateral malleolus. Pt able to range ankle from 40 degrees of plantarflexion and 7 degrees of dorsiflexion.  No pain with external rotation stress.  Sensation intact to superficial peroneal, deep peroneal, sural, saphenous, plantar nerve distributions. Motor intact to extensor hallux longus, tibialis anterior, gastrocnemius muscles, extensor mechanism intact. Limb is well perfused. Brisk capillary refill in all 5 digits. Compartments soft and compressible.       _____________________________________________________  STUDIES REVIEWED:  I personally reviewed the images and interpretation is as follows:  X-rays right ankle reveal: Maintained alignment of the patient's right trimalleolar ankle fracture that was surgically fixated.  There is fracture lines are no longer visualized indicating fracture opinion.  The ankle mortise relationships are well-maintained as well as the syndesmotic relationship.  The patient has development of some thinning of the tibiotalar space anteriorly.    PROCEDURES PERFORMED:  Procedures none    Mason Connolly DO

## 2024-07-30 ENCOUNTER — TELEPHONE (OUTPATIENT)
Age: 69
End: 2024-07-30

## 2024-07-30 NOTE — TELEPHONE ENCOUNTER
Caller: Fabiana at [facility]    Doctor: Cathleen    Reason for call:     Fabiana is checking on the form for Capacity Questionnaire, confirmed the fax number.    Call back#: n/a

## 2024-08-01 NOTE — TELEPHONE ENCOUNTER
Caller: Brii    Doctor: Cathleen    Reason for call: Calling to see if forms were received. There are scanned in, made aware of TAT. They will callback neck week to f/u    Call back#: [phone]

## 2024-08-05 NOTE — TELEPHONE ENCOUNTER
Caller: Fabiana - [facility]    Doctor: Cathleen    Reason for call: Fabiana calling again on status of form sent - advised received 7/30/24 and is in area to be completed. Turn around time is 10-14 business days.    Call back#: n/a

## 2025-02-10 ENCOUNTER — TELEPHONE (OUTPATIENT)
Dept: FAMILY MEDICINE CLINIC | Facility: CLINIC | Age: 70
End: 2025-02-10

## 2025-02-10 NOTE — TELEPHONE ENCOUNTER
Called and left msg letting pt know that dr concepcion is no longer with this practice and to see if he is going to see one of the providers here or if he is transferring to a new PCP

## 2025-02-12 ENCOUNTER — TELEPHONE (OUTPATIENT)
Dept: FAMILY MEDICINE CLINIC | Facility: CLINIC | Age: 70
End: 2025-02-12

## 2025-02-25 NOTE — TELEPHONE ENCOUNTER
02/25/25 10:11 AM        The office's request has been received, reviewed, and the patient chart updated. The PCP has successfully been removed with a patient attribution note. This message will now be completed.        Thank you  Abisai Randolph

## (undated) DEVICE — SUT PDS II 0 CT-1 36 IN Z346H

## (undated) DEVICE — ALCOHOL ISOPROPYL BLUE

## (undated) DEVICE — ACE WRAP 4 IN STERILE

## (undated) DEVICE — DRAPE C-ARM X-RAY

## (undated) DEVICE — PENCIL ELECTROSURG E-Z CLEAN -0035H

## (undated) DEVICE — PREP PAD BNS: Brand: CONVERTORS

## (undated) DEVICE — GLOVE SRG BIOGEL 7.5

## (undated) DEVICE — OCCLUSIVE GAUZE STRIP,3% BISMUTH TRIBROMOPHENATE IN PETROLATUM BLEND: Brand: XEROFORM

## (undated) DEVICE — DISPOSABLE EQUIPMENT COVER: Brand: SMALL TOWEL DRAPE

## (undated) DEVICE — BETHLEHEM UNIVERSAL  MIONR EXT: Brand: CARDINAL HEALTH

## (undated) DEVICE — DISPOSABLE OR TOWEL: Brand: CARDINAL HEALTH

## (undated) DEVICE — HEAVY DUTY TABLE COVER: Brand: CONVERTORS

## (undated) DEVICE — BANDAGE, ESMARK LF STR 6"X9' (20/CS): Brand: CYPRESS

## (undated) DEVICE — 2.0MM DRILL BIT QC 110MM 30MM CALIBRATION

## (undated) DEVICE — GLOVE INDICATOR PI UNDERGLOVE SZ 8 BLUE

## (undated) DEVICE — GLOVE INDICATOR PI UNDERGLOVE SZ 7.5 BLUE

## (undated) DEVICE — SPONGE SCRUB 4 PCT CHLORHEXIDINE

## (undated) DEVICE — ELECTRODE BLADE MOD E-Z CLEAN  2.75IN 7CM -0012AM

## (undated) DEVICE — DRAPE C-ARMOUR

## (undated) DEVICE — DRAPE SHEET X-LG

## (undated) DEVICE — 2.7MM CANNULATED DRILL BIT/QC 160MM

## (undated) DEVICE — GLOVE SRG BIOGEL 8

## (undated) DEVICE — GAUZE SPONGES,16 PLY: Brand: CURITY

## (undated) DEVICE — ACE WRAP 6 IN UNSTERILE

## (undated) DEVICE — SUT MONOCRYL 2-0 SH 27 IN Y417H

## (undated) DEVICE — DRAPE SHEET THREE QUARTER

## (undated) DEVICE — INTENDED FOR TISSUE SEPARATION, AND OTHER PROCEDURES THAT REQUIRE A SHARP SURGICAL BLADE TO PUNCTURE OR CUT.: Brand: BARD-PARKER SAFETY BLADES SIZE 15, STERILE

## (undated) DEVICE — PADDING CAST 4 IN  COTTON STRL

## (undated) DEVICE — BONE WAX WHITE: Brand: BONE WAX WHITE

## (undated) DEVICE — CURITY NON-ADHERENT STRIPS: Brand: CURITY

## (undated) DEVICE — CHLORAPREP HI-LITE 26ML ORANGE

## (undated) DEVICE — INTENDED FOR TISSUE SEPARATION, AND OTHER PROCEDURES THAT REQUIRE A SHARP SURGICAL BLADE TO PUNCTURE OR CUT.: Brand: BARD-PARKER SAFETY BLADES SIZE 10, STERILE

## (undated) DEVICE — SPLINT 5 X 30 IN FAST SET PLASTER

## (undated) DEVICE — PAD CAST 4 IN COTTON NON STERILE

## (undated) DEVICE — SUT ETHILON 2-0 PS 18 IN 585H

## (undated) DEVICE — 2.5MM DRILL BIT QC 170MM 80MM CALIBRATION

## (undated) DEVICE — DRAPE SURGIKIT SADDLE BAG